# Patient Record
Sex: FEMALE | Race: WHITE | Employment: FULL TIME | URBAN - METROPOLITAN AREA
[De-identification: names, ages, dates, MRNs, and addresses within clinical notes are randomized per-mention and may not be internally consistent; named-entity substitution may affect disease eponyms.]

---
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
## 2021-02-04 ENCOUNTER — OFFICE VISIT (OUTPATIENT)
Dept: PRIMARY CARE CLINIC | Age: 21
End: 2021-02-04
Payer: COMMERCIAL

## 2021-02-04 VITALS
HEART RATE: 67 BPM | SYSTOLIC BLOOD PRESSURE: 118 MMHG | OXYGEN SATURATION: 99 % | DIASTOLIC BLOOD PRESSURE: 78 MMHG | WEIGHT: 159 LBS | HEIGHT: 66 IN | TEMPERATURE: 97.7 F | BODY MASS INDEX: 25.55 KG/M2

## 2021-02-04 DIAGNOSIS — H55.09 HORIZONTAL NYSTAGMUS: ICD-10-CM

## 2021-02-04 DIAGNOSIS — Z23 NEED FOR INFLUENZA VACCINATION: ICD-10-CM

## 2021-02-04 DIAGNOSIS — E55.9 VITAMIN D INSUFFICIENCY: ICD-10-CM

## 2021-02-04 DIAGNOSIS — F41.1 GENERALIZED ANXIETY DISORDER: ICD-10-CM

## 2021-02-04 DIAGNOSIS — F41.1 GENERALIZED ANXIETY DISORDER: Primary | ICD-10-CM

## 2021-02-04 DIAGNOSIS — G43.009 MIGRAINE WITHOUT AURA AND WITHOUT STATUS MIGRAINOSUS, NOT INTRACTABLE: ICD-10-CM

## 2021-02-04 LAB
ALBUMIN SERPL-MCNC: 4.5 G/DL (ref 3.5–5.2)
ALP BLD-CCNC: 49 U/L (ref 35–104)
ALT SERPL-CCNC: 13 U/L (ref 0–32)
ANION GAP SERPL CALCULATED.3IONS-SCNC: 16 MMOL/L (ref 7–16)
AST SERPL-CCNC: 16 U/L (ref 0–31)
BASOPHILS ABSOLUTE: 0.04 E9/L (ref 0–0.2)
BASOPHILS RELATIVE PERCENT: 0.6 % (ref 0–2)
BILIRUB SERPL-MCNC: 0.4 MG/DL (ref 0–1.2)
BUN BLDV-MCNC: 12 MG/DL (ref 6–20)
CALCIUM SERPL-MCNC: 9.7 MG/DL (ref 8.6–10.2)
CHLORIDE BLD-SCNC: 103 MMOL/L (ref 98–107)
CO2: 23 MMOL/L (ref 22–29)
CREAT SERPL-MCNC: 0.8 MG/DL (ref 0.5–1)
EOSINOPHILS ABSOLUTE: 0.07 E9/L (ref 0.05–0.5)
EOSINOPHILS RELATIVE PERCENT: 1 % (ref 0–6)
GFR AFRICAN AMERICAN: >60
GFR NON-AFRICAN AMERICAN: >60 ML/MIN/1.73
GLUCOSE BLD-MCNC: 95 MG/DL (ref 74–99)
HCT VFR BLD CALC: 41.2 % (ref 34–48)
HEMOGLOBIN: 13.5 G/DL (ref 11.5–15.5)
IMMATURE GRANULOCYTES #: 0.02 E9/L
IMMATURE GRANULOCYTES %: 0.3 % (ref 0–5)
LYMPHOCYTES ABSOLUTE: 2.37 E9/L (ref 1.5–4)
LYMPHOCYTES RELATIVE PERCENT: 35.4 % (ref 20–42)
MCH RBC QN AUTO: 28.2 PG (ref 26–35)
MCHC RBC AUTO-ENTMCNC: 32.8 % (ref 32–34.5)
MCV RBC AUTO: 86 FL (ref 80–99.9)
MONOCYTES ABSOLUTE: 0.57 E9/L (ref 0.1–0.95)
MONOCYTES RELATIVE PERCENT: 8.5 % (ref 2–12)
NEUTROPHILS ABSOLUTE: 3.63 E9/L (ref 1.8–7.3)
NEUTROPHILS RELATIVE PERCENT: 54.2 % (ref 43–80)
PDW BLD-RTO: 12.9 FL (ref 11.5–15)
PLATELET # BLD: 354 E9/L (ref 130–450)
PMV BLD AUTO: 9.2 FL (ref 7–12)
POTASSIUM SERPL-SCNC: 4.1 MMOL/L (ref 3.5–5)
RBC # BLD: 4.79 E12/L (ref 3.5–5.5)
SODIUM BLD-SCNC: 142 MMOL/L (ref 132–146)
T4 FREE: 1.28 NG/DL (ref 0.93–1.7)
TOTAL PROTEIN: 7.7 G/DL (ref 6.4–8.3)
TSH SERPL DL<=0.05 MIU/L-ACNC: 2.37 UIU/ML (ref 0.27–4.2)
VITAMIN D 25-HYDROXY: 20 NG/ML (ref 30–100)
WBC # BLD: 6.7 E9/L (ref 4.5–11.5)

## 2021-02-04 PROCEDURE — G8419 CALC BMI OUT NRM PARAM NOF/U: HCPCS | Performed by: FAMILY MEDICINE

## 2021-02-04 PROCEDURE — 99214 OFFICE O/P EST MOD 30 MIN: CPT | Performed by: FAMILY MEDICINE

## 2021-02-04 PROCEDURE — 90471 IMMUNIZATION ADMIN: CPT | Performed by: FAMILY MEDICINE

## 2021-02-04 PROCEDURE — 90686 IIV4 VACC NO PRSV 0.5 ML IM: CPT | Performed by: FAMILY MEDICINE

## 2021-02-04 PROCEDURE — 1036F TOBACCO NON-USER: CPT | Performed by: FAMILY MEDICINE

## 2021-02-04 PROCEDURE — G8427 DOCREV CUR MEDS BY ELIG CLIN: HCPCS | Performed by: FAMILY MEDICINE

## 2021-02-04 PROCEDURE — G8482 FLU IMMUNIZE ORDER/ADMIN: HCPCS | Performed by: FAMILY MEDICINE

## 2021-02-04 RX ORDER — CITALOPRAM 10 MG/1
10 TABLET ORAL DAILY
Qty: 30 TABLET | Refills: 0 | Status: SHIPPED
Start: 2021-02-04 | End: 2021-03-06 | Stop reason: SDUPTHER

## 2021-02-04 RX ORDER — NORETHINDRONE ACETATE AND ETHINYL ESTRADIOL 1; .02 MG/1; MG/1
1 TABLET ORAL DAILY
COMMUNITY
End: 2021-03-29

## 2021-02-04 ASSESSMENT — PATIENT HEALTH QUESTIONNAIRE - PHQ9
SUM OF ALL RESPONSES TO PHQ9 QUESTIONS 1 & 2: 0
2. FEELING DOWN, DEPRESSED OR HOPELESS: 0
SUM OF ALL RESPONSES TO PHQ QUESTIONS 1-9: 0
SUM OF ALL RESPONSES TO PHQ QUESTIONS 1-9: 0
1. LITTLE INTEREST OR PLEASURE IN DOING THINGS: 0

## 2021-02-04 ASSESSMENT — ENCOUNTER SYMPTOMS
VOMITING: 0
DIARRHEA: 0
ABDOMINAL PAIN: 0
NAUSEA: 0
COUGH: 0
PHOTOPHOBIA: 0
WHEEZING: 0
SHORTNESS OF BREATH: 0
BACK PAIN: 0
ABDOMINAL DISTENTION: 0
CONSTIPATION: 0
EYE DISCHARGE: 0

## 2021-02-04 NOTE — PROGRESS NOTES
21  Wilmar Bowels : 2000 Sex: female  Age: 24 y.o. Chief Complaint   Patient presents with    Anxiety     discuss medications     HPI:  24 y.o. female presents today for 1 year follow up of migraines and anxiety. Patient's chart, medical, surgical and medication history all reviewed. Migraine  Patient complains of migraine headaches. She does not have a headache at this time. Description of Headaches:  Location of pain: left-sided unilateral  Radiation of pain?:none  Character of pain:aching and throbbing  Accompanying symptoms: nausea, vomiting, sonophobia, photophobia  Prodromal sx?: none  Typical precipitants: unknown    Temporal Pattern of Headaches:  Started having HAs 2 year ago  Worst time of day: any time  Awaken from sleep?: no, but will wake up in the morning with a headache. Seasonal pattern?: no  Clustering of HAs over time? no  Change with menstrual cycle?: no  Overall pattern since problem began: gradually improving    Degree of Functional Impairment: moderate    Current Use of Meds to Treat HA:  Abortive meds? Imitrex (has not used since starting Metoprolol- didn't know she could)  Daily use? no  Prophylactic meds? Metoprolol    Previous Meds Tried for Treatment of HA:  Beta blockers:  Metoprolol  Calcium channel blockers:  none  Anti-convulsants:  none  Triptans/Others:  Ibuprofen and Sumatriptan  oral (Imitrex )  Botox injections: no      Additional Relevant History:  History of head/neck trauma? no  History of head/neck surgery? no  Family h/o headache problems? no  Use of meds that might worsen HAs? no  Exposure to carbon monoxide? no  Substance use: alcohol: no, illicit drugs: no, tobacco: no, caffeine: no    Anxiety  Patient complains of evaluation of anxiety disorder and sleep disturbance. She has the following anxiety symptoms: difficulty concentrating, fatigue, feelings of losing control, racing thoughts, hypersomnia.  Onset of symptoms was approximately several years ago, gradually worsening since that time. She denies current suicidal and homicidal ideation. Previous treatment includes Lexapro and no therapy. She complains of the following side effects from the treatment: none. She was on previous treatment by last PCP, but doesn't remember why she stopped taking. ROS:  Review of Systems   Constitutional: Negative for chills, fatigue and fever. Eyes: Negative for photophobia, discharge and visual disturbance. Respiratory: Negative for cough, shortness of breath and wheezing. Cardiovascular: Negative for chest pain and palpitations. Gastrointestinal: Negative for abdominal distention, abdominal pain, constipation, diarrhea, nausea and vomiting. Musculoskeletal: Negative for arthralgias and back pain. Skin: Negative for rash. Neurological: Positive for headaches. Negative for dizziness. Psychiatric/Behavioral: Positive for sleep disturbance. Negative for dysphoric mood. The patient is nervous/anxious. All other systems reviewed and are negative.        Current Outpatient Medications:     norethindrone-ethinyl estradiol (JUNEL 1/20) 1-20 MG-MCG per tablet, Take 1 tablet by mouth daily, Disp: , Rfl:     citalopram (CELEXA) 10 MG tablet, Take 1 tablet by mouth daily, Disp: 30 tablet, Rfl: 0    metoprolol succinate (TOPROL XL) 25 MG extended release tablet, TAKE ONE TABLET BY MOUTH DAILY, Disp: 90 tablet, Rfl: 1    dicyclomine (BENTYL) 10 MG capsule, Take 1 capsule by mouth 4 times daily, Disp: 120 capsule, Rfl: 5    Fluticasone furoate-vilanterol (BREO ELLIPTA) 200-25 MCG/INH AEPB inhaler, Inhale into the lungs daily, Disp: , Rfl:     albuterol sulfate  (90 Base) MCG/ACT inhaler, Inhale 1 puff into the lungs as needed, Disp: , Rfl:     minocycline (MINOCIN;DYNACIN) 100 MG capsule, Take 100 mg by mouth daily, Disp: , Rfl:     SUMAtriptan (IMITREX) 50 MG tablet, Take 50 mg by mouth once as needed for Migraine, Disp: , Rfl:     No Known Allergies    History reviewed. No pertinent past medical history. History reviewed. No pertinent surgical history. History reviewed. No pertinent family history. Social History     Socioeconomic History    Marital status: Single     Spouse name: Not on file    Number of children: Not on file    Years of education: Not on file    Highest education level: Not on file   Occupational History    Not on file   Social Needs    Financial resource strain: Not on file    Food insecurity     Worry: Not on file     Inability: Not on file    Transportation needs     Medical: Not on file     Non-medical: Not on file   Tobacco Use    Smoking status: Never Smoker    Smokeless tobacco: Never Used   Substance and Sexual Activity    Alcohol use: Not on file    Drug use: Not on file    Sexual activity: Not on file   Lifestyle    Physical activity     Days per week: Not on file     Minutes per session: Not on file    Stress: Not on file   Relationships    Social connections     Talks on phone: Not on file     Gets together: Not on file     Attends Zoroastrian service: Not on file     Active member of club or organization: Not on file     Attends meetings of clubs or organizations: Not on file     Relationship status: Not on file    Intimate partner violence     Fear of current or ex partner: Not on file     Emotionally abused: Not on file     Physically abused: Not on file     Forced sexual activity: Not on file   Other Topics Concern    Not on file   Social History Narrative    Not on file       Vitals:    02/04/21 0850   BP: 118/78   Pulse: 67   Temp: 97.7 °F (36.5 °C)   SpO2: 99%   Weight: 159 lb (72.1 kg)   Height: 5' 6\" (1.676 m)       Physical Exam:  Physical Exam  Vitals signs and nursing note reviewed. Constitutional:       General: She is not in acute distress. Appearance: Normal appearance. She is well-developed and normal weight. She is not ill-appearing.    HENT:      Head: also check labs. Migraine without aura and without status migrainosus, not intractable  Well controlled with Metoprolol. Explained that it is OK for her to take Imitrex if she still needs it when she gets a migraine. Horizontal nystagmus  Did not have MRI and did not see Neuro due to pandemic at the time of her issues. Will monitor. Vitamin D insufficiency  -     Vitamin D 25 Hydroxy; Future    Need for influenza vaccination  -     INFLUENZA, QUADV, 3 YRS AND OLDER, IM PF, PREFILL SYR OR SDV, 0.5ML (AFLURIA QUADV, PF)          Return in about 4 weeks (around 3/4/2021) for Recheck.       Seen By:  Charlette Almonte, DO

## 2021-02-23 RX ORDER — ALBUTEROL SULFATE 90 UG/1
1 AEROSOL, METERED RESPIRATORY (INHALATION) EVERY 4 HOURS PRN
Qty: 3 INHALER | Refills: 1 | Status: SHIPPED
Start: 2021-02-23 | End: 2021-06-29

## 2021-02-23 RX ORDER — FLUTICASONE FUROATE AND VILANTEROL 200; 25 UG/1; UG/1
1 POWDER RESPIRATORY (INHALATION) DAILY
Qty: 3 EACH | Refills: 1 | Status: SHIPPED
Start: 2021-02-23 | End: 2021-11-10 | Stop reason: SDUPTHER

## 2021-03-28 ENCOUNTER — IMMUNIZATION (OUTPATIENT)
Dept: PRIMARY CARE CLINIC | Age: 21
End: 2021-03-28
Payer: COMMERCIAL

## 2021-03-28 PROCEDURE — 0001A PR IMM ADMN SARSCOV2 30MCG/0.3ML DIL RECON 1ST DOSE: CPT | Performed by: PHYSICIAN ASSISTANT

## 2021-03-28 PROCEDURE — 91300 COVID-19, PFIZER VACCINE 30MCG/0.3ML DOSE: CPT | Performed by: PHYSICIAN ASSISTANT

## 2021-03-29 ENCOUNTER — OFFICE VISIT (OUTPATIENT)
Dept: PRIMARY CARE CLINIC | Age: 21
End: 2021-03-29
Payer: COMMERCIAL

## 2021-03-29 VITALS
WEIGHT: 161 LBS | OXYGEN SATURATION: 100 % | BODY MASS INDEX: 25.88 KG/M2 | TEMPERATURE: 97.9 F | DIASTOLIC BLOOD PRESSURE: 80 MMHG | SYSTOLIC BLOOD PRESSURE: 118 MMHG | HEIGHT: 66 IN | HEART RATE: 59 BPM

## 2021-03-29 DIAGNOSIS — F41.1 GENERALIZED ANXIETY DISORDER: Primary | ICD-10-CM

## 2021-03-29 DIAGNOSIS — G43.009 MIGRAINE WITHOUT AURA AND WITHOUT STATUS MIGRAINOSUS, NOT INTRACTABLE: ICD-10-CM

## 2021-03-29 PROCEDURE — G8427 DOCREV CUR MEDS BY ELIG CLIN: HCPCS | Performed by: FAMILY MEDICINE

## 2021-03-29 PROCEDURE — G8419 CALC BMI OUT NRM PARAM NOF/U: HCPCS | Performed by: FAMILY MEDICINE

## 2021-03-29 PROCEDURE — 1036F TOBACCO NON-USER: CPT | Performed by: FAMILY MEDICINE

## 2021-03-29 PROCEDURE — G8482 FLU IMMUNIZE ORDER/ADMIN: HCPCS | Performed by: FAMILY MEDICINE

## 2021-03-29 PROCEDURE — 99214 OFFICE O/P EST MOD 30 MIN: CPT | Performed by: FAMILY MEDICINE

## 2021-03-29 RX ORDER — CITALOPRAM 20 MG/1
20 TABLET ORAL DAILY
Qty: 90 TABLET | Refills: 0 | Status: SHIPPED
Start: 2021-03-29 | End: 2021-06-29

## 2021-03-29 ASSESSMENT — ENCOUNTER SYMPTOMS
VOMITING: 0
CONSTIPATION: 0
ABDOMINAL DISTENTION: 0
COUGH: 0
ABDOMINAL PAIN: 0
BACK PAIN: 0
SHORTNESS OF BREATH: 0
PHOTOPHOBIA: 0
EYE DISCHARGE: 0
DIARRHEA: 0
NAUSEA: 0
WHEEZING: 0

## 2021-03-29 NOTE — PROGRESS NOTES
3/29/21  Oval Rust : 2000 Sex: female  Age: 24 y.o. Chief Complaint   Patient presents with    Anxiety     HPI:  24 y.o. female presents today for 2 month follow up of migraines and anxiety. Patient's chart, medical, surgical and medication history all reviewed. Migraine  Patient complains of migraine headaches. She does not have a headache at this time. Description of Headaches:  Location of pain: left-sided unilateral  Radiation of pain?:none  Character of pain:aching and throbbing  Accompanying symptoms: nausea, vomiting, sonophobia, photophobia  Prodromal sx?: none  Typical precipitants: unknown    Temporal Pattern of Headaches:  Started having HAs 2 year ago  Worst time of day: any time  Awaken from sleep?: no, but will wake up in the morning with a headache. Seasonal pattern?: no  Clustering of HAs over time? no  Change with menstrual cycle?: no  Overall pattern since problem began: gradually improving    Degree of Functional Impairment: moderate    Current Use of Meds to Treat HA:  Abortive meds? Imitrex   Daily use? no  Prophylactic meds? Metoprolol    Previous Meds Tried for Treatment of HA:  Beta blockers:  Metoprolol  Calcium channel blockers:  none  Anti-convulsants:  none  Triptans/Others:  Ibuprofen and Sumatriptan  oral (Imitrex )  Botox injections: no      Additional Relevant History:  History of head/neck trauma? no  History of head/neck surgery? no  Family h/o headache problems? no  Use of meds that might worsen HAs? no  Exposure to carbon monoxide? no  Substance use: alcohol: no, illicit drugs: no, tobacco: no, caffeine: no    Anxiety  Patient complains of evaluation of anxiety disorder and sleep disturbance. She has the following anxiety symptoms: difficulty concentrating, fatigue, feelings of losing control, racing thoughts, hypersomnia. Onset of symptoms was approximately several years ago, gradually worsening since that time.  She denies current suicidal and homicidal ideation. Previous treatment includes Celexa and Lexapro and no therapy. She complains of the following side effects from the treatment: none. Patient was started on Celexa last OV. Has been feeling overall better with less panic attacks. ROS:  Review of Systems   Constitutional: Negative for chills, fatigue and fever. Eyes: Negative for photophobia, discharge and visual disturbance. Respiratory: Negative for cough, shortness of breath and wheezing. Cardiovascular: Negative for chest pain and palpitations. Gastrointestinal: Negative for abdominal distention, abdominal pain, constipation, diarrhea, nausea and vomiting. Musculoskeletal: Negative for arthralgias and back pain. Skin: Negative for rash. Neurological: Positive for headaches. Negative for dizziness. Psychiatric/Behavioral: Positive for sleep disturbance. Negative for dysphoric mood. The patient is nervous/anxious. All other systems reviewed and are negative.        Current Outpatient Medications:     citalopram (CELEXA) 20 MG tablet, Take 1 tablet by mouth daily, Disp: 90 tablet, Rfl: 0    metoprolol succinate (TOPROL XL) 25 MG extended release tablet, Take 1 tablet by mouth daily, Disp: 90 tablet, Rfl: 1    norethindrone-ethinyl estradiol (JUNEL FE 1/20) 1-20 MG-MCG per tablet, Take 1 tablet by mouth daily, Disp: 84 tablet, Rfl: 3    albuterol sulfate  (90 Base) MCG/ACT inhaler, Inhale 1 puff into the lungs every 4 hours as needed for Wheezing or Shortness of Breath, Disp: 3 Inhaler, Rfl: 1    Fluticasone furoate-vilanterol (BREO ELLIPTA) 200-25 MCG/INH AEPB inhaler, Inhale 1 puff into the lungs daily, Disp: 3 each, Rfl: 1    SUMAtriptan (IMITREX) 50 MG tablet, Take 50 mg by mouth once as needed for Migraine, Disp: , Rfl:     dicyclomine (BENTYL) 10 MG capsule, Take 1 capsule by mouth 4 times daily, Disp: 120 capsule, Rfl: 5    minocycline (MINOCIN;DYNACIN) 100 MG capsule, Take 100 mg by mouth daily, Disp: , Rfl:     No Known Allergies    History reviewed. No pertinent past medical history. History reviewed. No pertinent surgical history. History reviewed. No pertinent family history. Social History     Socioeconomic History    Marital status: Single     Spouse name: Not on file    Number of children: Not on file    Years of education: Not on file    Highest education level: Not on file   Occupational History    Not on file   Social Needs    Financial resource strain: Not on file    Food insecurity     Worry: Not on file     Inability: Not on file    Transportation needs     Medical: Not on file     Non-medical: Not on file   Tobacco Use    Smoking status: Never Smoker    Smokeless tobacco: Never Used   Substance and Sexual Activity    Alcohol use: Not on file    Drug use: Not on file    Sexual activity: Not on file   Lifestyle    Physical activity     Days per week: Not on file     Minutes per session: Not on file    Stress: Not on file   Relationships    Social connections     Talks on phone: Not on file     Gets together: Not on file     Attends Taoism service: Not on file     Active member of club or organization: Not on file     Attends meetings of clubs or organizations: Not on file     Relationship status: Not on file    Intimate partner violence     Fear of current or ex partner: Not on file     Emotionally abused: Not on file     Physically abused: Not on file     Forced sexual activity: Not on file   Other Topics Concern    Not on file   Social History Narrative    Not on file       Vitals:    03/29/21 0921   BP: 118/80   Pulse: 59   Temp: 97.9 °F (36.6 °C)   SpO2: 100%   Weight: 161 lb (73 kg)   Height: 5' 6\" (1.676 m)       Physical Exam:  Physical Exam  Vitals signs and nursing note reviewed. Constitutional:       General: She is not in acute distress. Appearance: Normal appearance. She is well-developed and normal weight. She is not ill-appearing.    HENT: Head: Normocephalic and atraumatic. Right Ear: External ear normal.      Left Ear: External ear normal.      Nose:      Comments: Wearing mask  Eyes:      General: No scleral icterus. Extraocular Movements: Extraocular movements intact. Conjunctiva/sclera: Conjunctivae normal.   Neck:      Musculoskeletal: Normal range of motion and neck supple. Thyroid: No thyromegaly. Cardiovascular:      Rate and Rhythm: Normal rate and regular rhythm. Heart sounds: Normal heart sounds. No murmur. Pulmonary:      Effort: Pulmonary effort is normal. No respiratory distress. Breath sounds: Normal breath sounds. No wheezing. Musculoskeletal: Normal range of motion. General: No tenderness or deformity. Right lower leg: No edema. Left lower leg: No edema. Lymphadenopathy:      Cervical: No cervical adenopathy. Skin:     General: Skin is warm and dry. Findings: No rash. Neurological:      General: No focal deficit present. Mental Status: She is alert and oriented to person, place, and time. Gait: Gait normal.   Psychiatric:         Mood and Affect: Mood is anxious. Speech: Speech normal.         Behavior: Behavior normal.         Thought Content:  Thought content normal.       Labs  CBC with Differential:    Lab Results   Component Value Date    WBC 6.7 02/04/2021    RBC 4.79 02/04/2021    HGB 13.5 02/04/2021    HCT 41.2 02/04/2021     02/04/2021    MCV 86.0 02/04/2021    MCH 28.2 02/04/2021    MCHC 32.8 02/04/2021    RDW 12.9 02/04/2021    LYMPHOPCT 35.4 02/04/2021    MONOPCT 8.5 02/04/2021    BASOPCT 0.6 02/04/2021    MONOSABS 0.57 02/04/2021    LYMPHSABS 2.37 02/04/2021    EOSABS 0.07 02/04/2021    BASOSABS 0.04 02/04/2021     CMP:    Lab Results   Component Value Date     02/04/2021    K 4.1 02/04/2021     02/04/2021    CO2 23 02/04/2021    BUN 12 02/04/2021    CREATININE 0.8 02/04/2021    GFRAA >60 02/04/2021    LABGLOM >60 02/04/2021    GLUCOSE 95 02/04/2021    PROT 7.7 02/04/2021    LABALBU 4.5 02/04/2021    CALCIUM 9.7 02/04/2021    BILITOT 0.4 02/04/2021    ALKPHOS 49 02/04/2021    AST 16 02/04/2021    ALT 13 02/04/2021     U/A:  No results found for: NITRITE, COLORU, PROTEINU, PHUR, LABCAST, WBCUA, RBCUA, MUCUS, TRICHOMONAS, YEAST, BACTERIA, CLARITYU, SPECGRAV, LEUKOCYTESUR, UROBILINOGEN, BILIRUBINUR, BLOODU, GLUCOSEU, AMORPHOUS  HgBA1c:    Lab Results   Component Value Date    LABA1C 4.9 10/28/2019     FLP:  No results found for: TRIG, HDL, LDLCALC, LDLDIRECT, LABVLDL  TSH:    Lab Results   Component Value Date    TSH 2.370 02/04/2021        Assessment and Plan:  Praveena Oshea was seen today for anxiety. Diagnoses and all orders for this visit:    Generalized anxiety disorder  -     citalopram (CELEXA) 20 MG tablet; Take 1 tablet by mouth daily  Patient doing much better overall. Does think she would get more benefit from higher dose. No side effects. Will increase to 20 mg at this time. Migraine without aura and without status migrainosus, not intractable  Well controlled on Metoprolol. Imitrex helps within an hour. Return in about 3 months (around 6/29/2021) for Recheck.       Seen By:  Konrad Childress DO

## 2021-04-19 ENCOUNTER — IMMUNIZATION (OUTPATIENT)
Dept: PRIMARY CARE CLINIC | Age: 21
End: 2021-04-19
Payer: COMMERCIAL

## 2021-04-19 PROCEDURE — 91300 COVID-19, PFIZER VACCINE 30MCG/0.3ML DOSE: CPT | Performed by: PHYSICIAN ASSISTANT

## 2021-04-19 PROCEDURE — 0002A COVID-19, PFIZER VACCINE 30MCG/0.3ML DOSE: CPT | Performed by: PHYSICIAN ASSISTANT

## 2021-06-08 DIAGNOSIS — G43.009 MIGRAINE WITHOUT AURA AND WITHOUT STATUS MIGRAINOSUS, NOT INTRACTABLE: ICD-10-CM

## 2021-06-08 RX ORDER — SUMATRIPTAN 50 MG/1
50 TABLET, FILM COATED ORAL
Qty: 9 TABLET | Refills: 5 | Status: SHIPPED
Start: 2021-06-08 | End: 2021-06-29

## 2021-06-29 ENCOUNTER — OFFICE VISIT (OUTPATIENT)
Dept: PRIMARY CARE CLINIC | Age: 21
End: 2021-06-29
Payer: COMMERCIAL

## 2021-06-29 VITALS
HEIGHT: 66 IN | HEART RATE: 77 BPM | OXYGEN SATURATION: 99 % | SYSTOLIC BLOOD PRESSURE: 118 MMHG | TEMPERATURE: 97.3 F | BODY MASS INDEX: 26.52 KG/M2 | DIASTOLIC BLOOD PRESSURE: 80 MMHG | WEIGHT: 165 LBS

## 2021-06-29 DIAGNOSIS — R55 NEAR SYNCOPE: ICD-10-CM

## 2021-06-29 DIAGNOSIS — F41.1 GENERALIZED ANXIETY DISORDER: ICD-10-CM

## 2021-06-29 DIAGNOSIS — H55.09 HORIZONTAL NYSTAGMUS: ICD-10-CM

## 2021-06-29 DIAGNOSIS — G43.009 MIGRAINE WITHOUT AURA AND WITHOUT STATUS MIGRAINOSUS, NOT INTRACTABLE: Primary | ICD-10-CM

## 2021-06-29 LAB
CHP ED QC CHECK: NORMAL
GLUCOSE BLD-MCNC: 116 MG/DL

## 2021-06-29 PROCEDURE — 99214 OFFICE O/P EST MOD 30 MIN: CPT | Performed by: FAMILY MEDICINE

## 2021-06-29 PROCEDURE — G8419 CALC BMI OUT NRM PARAM NOF/U: HCPCS | Performed by: FAMILY MEDICINE

## 2021-06-29 PROCEDURE — 82962 GLUCOSE BLOOD TEST: CPT | Performed by: FAMILY MEDICINE

## 2021-06-29 PROCEDURE — G8427 DOCREV CUR MEDS BY ELIG CLIN: HCPCS | Performed by: FAMILY MEDICINE

## 2021-06-29 PROCEDURE — 1036F TOBACCO NON-USER: CPT | Performed by: FAMILY MEDICINE

## 2021-06-29 RX ORDER — CITALOPRAM 20 MG/1
20 TABLET ORAL DAILY
COMMUNITY
End: 2021-12-15 | Stop reason: SDUPTHER

## 2021-06-29 RX ORDER — TOPIRAMATE 25 MG/1
50 TABLET ORAL 2 TIMES DAILY
Qty: 120 TABLET | Refills: 5 | Status: SHIPPED
Start: 2021-06-29 | End: 2021-11-10

## 2021-06-29 RX ORDER — SUMATRIPTAN 50 MG/1
50 TABLET, FILM COATED ORAL
COMMUNITY
End: 2022-08-09 | Stop reason: ALTCHOICE

## 2021-06-29 RX ORDER — CITALOPRAM 20 MG/1
20 TABLET ORAL DAILY
Qty: 90 TABLET | Refills: 1 | Status: SHIPPED
Start: 2021-06-29 | End: 2021-11-10

## 2021-06-29 ASSESSMENT — ENCOUNTER SYMPTOMS
VOMITING: 0
PHOTOPHOBIA: 0
EYE DISCHARGE: 0
ABDOMINAL DISTENTION: 0
SHORTNESS OF BREATH: 0
DIARRHEA: 0
BACK PAIN: 0
WHEEZING: 0
NAUSEA: 0
COUGH: 0
CONSTIPATION: 0
ABDOMINAL PAIN: 0

## 2021-06-29 NOTE — PROGRESS NOTES
several years ago, gradually worsening since that time. She denies current suicidal and homicidal ideation. Previous treatment includes Celexa and Lexapro and no therapy. She complains of the following side effects from the treatment: none. Patient noting further improvement with increased dose of Celexa. ROS:  Review of Systems   Constitutional: Negative for chills, fatigue and fever. Eyes: Negative for photophobia, discharge and visual disturbance. Respiratory: Negative for cough, shortness of breath and wheezing. Cardiovascular: Negative for chest pain and palpitations. Gastrointestinal: Negative for abdominal distention, abdominal pain, constipation, diarrhea, nausea and vomiting. Musculoskeletal: Negative for arthralgias and back pain. Skin: Negative for rash. Neurological: Positive for headaches. Negative for dizziness. Psychiatric/Behavioral: Positive for sleep disturbance. Negative for dysphoric mood. The patient is nervous/anxious. All other systems reviewed and are negative. Current Outpatient Medications:     citalopram (CELEXA) 20 MG tablet, Take 20 mg by mouth daily, Disp: , Rfl:     SUMAtriptan (IMITREX) 50 MG tablet, Take 50 mg by mouth once as needed for Migraine, Disp: , Rfl:     Cholecalciferol (VITAMIN D3) 125 MCG (5000 UT) TABS, Take 5,000 Units by mouth daily, Disp: , Rfl:     citalopram (CELEXA) 20 MG tablet, Take 1 tablet by mouth daily, Disp: 90 tablet, Rfl: 1    topiramate (TOPAMAX) 25 MG tablet, Take 2 tablets by mouth 2 times daily, Disp: 120 tablet, Rfl: 5    norethindrone-ethinyl estradiol (JUNEL FE 1/20) 1-20 MG-MCG per tablet, Take 1 tablet by mouth daily, Disp: 84 tablet, Rfl: 3    dicyclomine (BENTYL) 10 MG capsule, Take 1 capsule by mouth 4 times daily, Disp: 120 capsule, Rfl: 5    minocycline (MINOCIN;DYNACIN) 100 MG capsule, Take 100 mg by mouth daily, Disp: , Rfl:     No Known Allergies    History reviewed. No pertinent past medical history. History reviewed. No pertinent surgical history. History reviewed. No pertinent family history. Social History     Socioeconomic History    Marital status: Single     Spouse name: Not on file    Number of children: Not on file    Years of education: Not on file    Highest education level: Not on file   Occupational History    Not on file   Tobacco Use    Smoking status: Never Smoker    Smokeless tobacco: Never Used   Vaping Use    Vaping Use: Never used   Substance and Sexual Activity    Alcohol use: Not on file    Drug use: Not on file    Sexual activity: Not on file   Other Topics Concern    Not on file   Social History Narrative    Not on file     Social Determinants of Health     Financial Resource Strain:     Difficulty of Paying Living Expenses:    Food Insecurity:     Worried About Running Out of Food in the Last Year:     [address] in the Last Year:    Transportation Needs:     Lack of Transportation (Medical):  Lack of Transportation (Non-Medical):    Physical Activity:     Days of Exercise per Week:     Minutes of Exercise per Session:    Stress:     Feeling of Stress :    Social Connections:     Frequency of Communication with Friends and Family:     Frequency of Social Gatherings with Friends and Family:     Attends Anabaptist Services:     Active Member of Clubs or Organizations:     Attends Club or Organization Meetings:     Marital Status:    Intimate Partner Violence:     Fear of Current or Ex-Partner:     Emotionally Abused:     Physically Abused:     Sexually Abused:        Vitals:    06/29/21 0958 06/29/21 1036   BP: 116/82 118/80   Pulse: 71 77   Temp: 98.1 °F (36.7 °C) 97.3 °F (36.3 °C)   SpO2: 98% 99%   Weight: 165 lb (74.8 kg)    Height: 5' 6\" (1.676 m)        Physical Exam:  Physical Exam  Vitals and nursing note reviewed. Constitutional:       General: She is not in acute distress. Appearance: Normal appearance.  She is well-developed and normal weight. She is not ill-appearing. HENT:      Head: Normocephalic and atraumatic. Right Ear: External ear normal.      Left Ear: External ear normal.      Nose:      Comments: Wearing mask  Eyes:      General: No scleral icterus. Extraocular Movements: Extraocular movements intact. Conjunctiva/sclera: Conjunctivae normal.   Neck:      Thyroid: No thyromegaly. Cardiovascular:      Rate and Rhythm: Normal rate and regular rhythm. Heart sounds: Normal heart sounds. No murmur heard. Pulmonary:      Effort: Pulmonary effort is normal. No respiratory distress. Breath sounds: Normal breath sounds. No wheezing. Musculoskeletal:         General: No tenderness or deformity. Normal range of motion. Cervical back: Normal range of motion and neck supple. Right lower leg: No edema. Left lower leg: No edema. Lymphadenopathy:      Cervical: No cervical adenopathy. Skin:     General: Skin is warm and dry. Findings: No rash. Neurological:      General: No focal deficit present. Mental Status: She is alert and oriented to person, place, and time. Gait: Gait normal.   Psychiatric:         Mood and Affect: Mood is anxious. Speech: Speech normal.         Behavior: Behavior normal.         Thought Content:  Thought content normal.       Labs  CBC with Differential:    Lab Results   Component Value Date    WBC 6.7 02/04/2021    RBC 4.79 02/04/2021    HGB 13.5 02/04/2021    HCT 41.2 02/04/2021     02/04/2021    MCV 86.0 02/04/2021    MCH 28.2 02/04/2021    MCHC 32.8 02/04/2021    RDW 12.9 02/04/2021    LYMPHOPCT 35.4 02/04/2021    MONOPCT 8.5 02/04/2021    BASOPCT 0.6 02/04/2021    MONOSABS 0.57 02/04/2021    LYMPHSABS 2.37 02/04/2021    EOSABS 0.07 02/04/2021    BASOSABS 0.04 02/04/2021     CMP:    Lab Results   Component Value Date     02/04/2021    K 4.1 02/04/2021     02/04/2021    CO2 23 02/04/2021    BUN 12 02/04/2021 CREATININE 0.8 02/04/2021    GFRAA >60 02/04/2021    LABGLOM >60 02/04/2021    GLUCOSE 95 02/04/2021    PROT 7.7 02/04/2021    LABALBU 4.5 02/04/2021    CALCIUM 9.7 02/04/2021    BILITOT 0.4 02/04/2021    ALKPHOS 49 02/04/2021    AST 16 02/04/2021    ALT 13 02/04/2021     U/A:  No results found for: NITRITE, COLORU, PROTEINU, PHUR, LABCAST, WBCUA, RBCUA, MUCUS, TRICHOMONAS, YEAST, BACTERIA, CLARITYU, SPECGRAV, LEUKOCYTESUR, UROBILINOGEN, BILIRUBINUR, BLOODU, GLUCOSEU, AMORPHOUS  HgBA1c:    Lab Results   Component Value Date    LABA1C 4.9 10/28/2019     FLP:  No results found for: TRIG, HDL, LDLCALC, LDLDIRECT, LABVLDL  TSH:    Lab Results   Component Value Date    TSH 2.370 02/04/2021        Assessment and Plan:  Rica Burkett was seen today for anxiety and migraine. Diagnoses and all orders for this visit:    Migraine without aura and without status migrainosus, not intractable  -     topiramate (TOPAMAX) 25 MG tablet; Take 2 tablets by mouth 2 times daily  Patient having fatigue throughout the day despite a full night of sleep. Will try switching to Topamax instead. Has had eyes/vision checked, but feels pain behind eyes with migraines. Generalized anxiety disorder  -     citalopram (CELEXA) 20 MG tablet; Take 1 tablet by mouth daily  Stable    Horizontal nystagmus  -     Ambulatory referral to Neurology  Mom still noticing issues with eyes \"bouncing\" back and forth. Never had CT that was ordered and never saw Neuro. Will get another opinion. At the end of the patient's appointment, she was getting up to speak to the referral specialists when she all of a sudden noted ringing in her ears and dizziness. Felt like she was going to pass out. She was seated in a chair and vitals were taken. All normal.  BS was 116. Patient improved within 10 minutes. Stated she hadn't had something to eat or drink today. Will monitor. Return in about 4 months (around 10/29/2021) for Recheck.       Seen By:  William Garcia June, DO

## 2021-11-10 ENCOUNTER — OFFICE VISIT (OUTPATIENT)
Dept: PRIMARY CARE CLINIC | Age: 21
End: 2021-11-10
Payer: COMMERCIAL

## 2021-11-10 VITALS
HEIGHT: 66 IN | BODY MASS INDEX: 27.32 KG/M2 | WEIGHT: 170 LBS | TEMPERATURE: 95.1 F | SYSTOLIC BLOOD PRESSURE: 122 MMHG | DIASTOLIC BLOOD PRESSURE: 80 MMHG

## 2021-11-10 DIAGNOSIS — F41.1 GENERALIZED ANXIETY DISORDER: ICD-10-CM

## 2021-11-10 DIAGNOSIS — F33.8 SEASONAL AFFECTIVE DISORDER (HCC): ICD-10-CM

## 2021-11-10 DIAGNOSIS — G43.009 MIGRAINE WITHOUT AURA AND WITHOUT STATUS MIGRAINOSUS, NOT INTRACTABLE: Primary | ICD-10-CM

## 2021-11-10 DIAGNOSIS — J45.20 MILD INTERMITTENT ASTHMA WITHOUT COMPLICATION: ICD-10-CM

## 2021-11-10 DIAGNOSIS — L70.0 CYSTIC ACNE: ICD-10-CM

## 2021-11-10 LAB
ALBUMIN SERPL-MCNC: 4.5 G/DL (ref 3.5–5.2)
ALP BLD-CCNC: 56 U/L (ref 35–104)
ALT SERPL-CCNC: 14 U/L (ref 0–32)
AST SERPL-CCNC: 16 U/L (ref 0–31)
BASOPHILS ABSOLUTE: 0.04 E9/L (ref 0–0.2)
BASOPHILS RELATIVE PERCENT: 0.7 % (ref 0–2)
BILIRUB SERPL-MCNC: 0.2 MG/DL (ref 0–1.2)
BILIRUBIN DIRECT: <0.2 MG/DL (ref 0–0.3)
BILIRUBIN, INDIRECT: NORMAL MG/DL (ref 0–1)
CHOLESTEROL, TOTAL: 183 MG/DL (ref 0–199)
CORTISOL TOTAL: 19.77 MCG/DL (ref 2.68–18.4)
EOSINOPHILS ABSOLUTE: 0.05 E9/L (ref 0.05–0.5)
EOSINOPHILS RELATIVE PERCENT: 0.9 % (ref 0–6)
FOLLICLE STIMULATING HORMONE: 5.4 MIU/ML
HCT VFR BLD CALC: 41.2 % (ref 34–48)
HDLC SERPL-MCNC: 49 MG/DL
HEMOGLOBIN: 13.7 G/DL (ref 11.5–15.5)
IMMATURE GRANULOCYTES #: 0.01 E9/L
IMMATURE GRANULOCYTES %: 0.2 % (ref 0–5)
LDL CHOLESTEROL CALCULATED: 115 MG/DL (ref 0–99)
LUTEINIZING HORMONE: 5.6 MIU/ML
LYMPHOCYTES ABSOLUTE: 1.81 E9/L (ref 1.5–4)
LYMPHOCYTES RELATIVE PERCENT: 32.6 % (ref 20–42)
MCH RBC QN AUTO: 28.8 PG (ref 26–35)
MCHC RBC AUTO-ENTMCNC: 33.3 % (ref 32–34.5)
MCV RBC AUTO: 86.7 FL (ref 80–99.9)
MONOCYTES ABSOLUTE: 0.43 E9/L (ref 0.1–0.95)
MONOCYTES RELATIVE PERCENT: 7.7 % (ref 2–12)
NEUTROPHILS ABSOLUTE: 3.21 E9/L (ref 1.8–7.3)
NEUTROPHILS RELATIVE PERCENT: 57.9 % (ref 43–80)
PDW BLD-RTO: 12.7 FL (ref 11.5–15)
PLATELET # BLD: 344 E9/L (ref 130–450)
PMV BLD AUTO: 9.1 FL (ref 7–12)
RBC # BLD: 4.75 E12/L (ref 3.5–5.5)
T4 FREE: 0.94 NG/DL (ref 0.93–1.7)
TOTAL PROTEIN: 8 G/DL (ref 6.4–8.3)
TRIGL SERPL-MCNC: 93 MG/DL (ref 0–149)
TSH SERPL DL<=0.05 MIU/L-ACNC: 1.08 UIU/ML (ref 0.27–4.2)
VLDLC SERPL CALC-MCNC: 19 MG/DL
WBC # BLD: 5.6 E9/L (ref 4.5–11.5)

## 2021-11-10 PROCEDURE — G8427 DOCREV CUR MEDS BY ELIG CLIN: HCPCS | Performed by: FAMILY MEDICINE

## 2021-11-10 PROCEDURE — G8484 FLU IMMUNIZE NO ADMIN: HCPCS | Performed by: FAMILY MEDICINE

## 2021-11-10 PROCEDURE — 99214 OFFICE O/P EST MOD 30 MIN: CPT | Performed by: FAMILY MEDICINE

## 2021-11-10 PROCEDURE — 1036F TOBACCO NON-USER: CPT | Performed by: FAMILY MEDICINE

## 2021-11-10 PROCEDURE — G8419 CALC BMI OUT NRM PARAM NOF/U: HCPCS | Performed by: FAMILY MEDICINE

## 2021-11-10 RX ORDER — BUPROPION HYDROCHLORIDE 150 MG/1
150 TABLET ORAL EVERY MORNING
Qty: 30 TABLET | Refills: 5 | Status: SHIPPED
Start: 2021-11-10 | End: 2021-12-15 | Stop reason: ALTCHOICE

## 2021-11-10 RX ORDER — FLUTICASONE FUROATE AND VILANTEROL 200; 25 UG/1; UG/1
1 POWDER RESPIRATORY (INHALATION) DAILY
Qty: 3 EACH | Refills: 1 | Status: SHIPPED | OUTPATIENT
Start: 2021-11-10 | End: 2022-02-08

## 2021-11-10 RX ORDER — TOPIRAMATE 50 MG/1
50 TABLET, FILM COATED ORAL 2 TIMES DAILY
Qty: 60 TABLET | Refills: 5 | Status: SHIPPED
Start: 2021-11-10 | End: 2022-08-09 | Stop reason: SDUPTHER

## 2021-11-10 RX ORDER — ALBUTEROL SULFATE 90 UG/1
2 AEROSOL, METERED RESPIRATORY (INHALATION) EVERY 6 HOURS PRN
COMMUNITY

## 2021-11-10 RX ORDER — ALBUTEROL SULFATE 90 UG/1
1 AEROSOL, METERED RESPIRATORY (INHALATION) EVERY 4 HOURS PRN
Qty: 3 EACH | Refills: 3 | Status: SHIPPED
Start: 2021-11-10 | End: 2021-12-15

## 2021-11-10 RX ORDER — TOPIRAMATE 25 MG/1
25 TABLET ORAL 2 TIMES DAILY
COMMUNITY
End: 2021-11-10

## 2021-11-10 ASSESSMENT — ENCOUNTER SYMPTOMS
CONSTIPATION: 0
COUGH: 0
NAUSEA: 0
BACK PAIN: 0
DIARRHEA: 0
PHOTOPHOBIA: 0
ABDOMINAL PAIN: 0
ABDOMINAL DISTENTION: 0
VOMITING: 0
SHORTNESS OF BREATH: 0
EYE DISCHARGE: 0
WHEEZING: 0

## 2021-11-10 NOTE — PROGRESS NOTES
11/10/21  Gerardo Ek : 2000 Sex: female  Age: 22 y.o. Chief Complaint   Patient presents with    Migraine     topamax is helping with migraines     HPI:  22 y.o. female presents today for 6 month follow up of chronic medical conditions, medication refills. Patient's chart, medical, surgical and medication history all reviewed. Migraine  Patient complains of migraine headaches. She does not have a headache at this time. Description of Headaches:  Location of pain: left-sided unilateral  Radiation of pain?:none  Character of pain:aching and throbbing  Accompanying symptoms: nausea, vomiting, sonophobia, photophobia  Prodromal sx?: pain behind eyes  Typical precipitants: unknown    Temporal Pattern of Headaches:  Started having HAs 2 year ago  Worst time of day: any time  Awaken from sleep?: no, but will wake up in the morning with a headache. Seasonal pattern?: no  Clustering of HAs over time? no  Change with menstrual cycle?: no  Overall pattern since problem began: gradually improving    Degree of Functional Impairment: moderate    Current Use of Meds to Treat HA:  Abortive meds? Imitrex   Daily use? no  Prophylactic meds? Topamax    Previous Meds Tried for Treatment of HA:  Beta blockers:  Metoprolol (caused fatigue)  Calcium channel blockers:  none  Anti-convulsants:  topamax  Triptans/Others:  Ibuprofen and Sumatriptan  oral (Imitrex )  Botox injections: no      Additional Relevant History:  History of head/neck trauma? no  History of head/neck surgery? no  Family h/o headache problems? no  Use of meds that might worsen HAs? no  Exposure to carbon monoxide? no  Substance use: alcohol: no, illicit drugs: no, tobacco: no, caffeine: no    Anxiety/Depression  Patient complains of evaluation of anxiety disorder and sleep disturbance. She has the following anxiety symptoms: difficulty concentrating, fatigue, feelings of losing control, racing thoughts, hypersomnia.  Onset of symptoms was approximately several years ago, gradually worsening since that time. She denies current suicidal and homicidal ideation. Previous treatment includes Celexa and Lexapro and no therapy. She complains of the following side effects from the treatment: none. Patient noting improvement in anxiety, but having issues with depression. Feels like the weather causes her to feel more down. ROS:  Review of Systems   Constitutional: Negative for chills, fatigue and fever. Eyes: Negative for photophobia, discharge and visual disturbance. Respiratory: Negative for cough, shortness of breath and wheezing. Cardiovascular: Negative for chest pain and palpitations. Gastrointestinal: Negative for abdominal distention, abdominal pain, constipation, diarrhea, nausea and vomiting. Musculoskeletal: Negative for arthralgias and back pain. Skin: Negative for rash. Neurological: Positive for headaches. Negative for dizziness. Psychiatric/Behavioral: Positive for sleep disturbance. Negative for dysphoric mood. The patient is nervous/anxious. All other systems reviewed and are negative.        Current Outpatient Medications:     albuterol sulfate HFA (VENTOLIN HFA) 108 (90 Base) MCG/ACT inhaler, Inhale 2 puffs into the lungs every 6 hours as needed for Wheezing, Disp: , Rfl:     albuterol sulfate  (90 Base) MCG/ACT inhaler, Inhale 1 puff into the lungs every 4 hours as needed for Wheezing or Shortness of Breath, Disp: 3 each, Rfl: 3    Fluticasone furoate-vilanterol (BREO ELLIPTA) 200-25 MCG/INH AEPB inhaler, Inhale 1 puff into the lungs daily, Disp: 3 each, Rfl: 1    buPROPion (WELLBUTRIN XL) 150 MG extended release tablet, Take 1 tablet by mouth every morning, Disp: 30 tablet, Rfl: 5    topiramate (TOPAMAX) 50 MG tablet, Take 1 tablet by mouth 2 times daily, Disp: 60 tablet, Rfl: 5    citalopram (CELEXA) 20 MG tablet, Take 20 mg by mouth daily, Disp: , Rfl:     SUMAtriptan (IMITREX) 50 MG tablet, Take 50 mg by mouth once as needed for Migraine, Disp: , Rfl:     Cholecalciferol (VITAMIN D3) 125 MCG (5000 UT) TABS, Take 5,000 Units by mouth daily, Disp: , Rfl:     norethindrone-ethinyl estradiol (JUNEL FE 1/20) 1-20 MG-MCG per tablet, Take 1 tablet by mouth daily, Disp: 84 tablet, Rfl: 3    minocycline (MINOCIN;DYNACIN) 100 MG capsule, Take 100 mg by mouth daily, Disp: , Rfl:     No Known Allergies    History reviewed. No pertinent past medical history. History reviewed. No pertinent surgical history. History reviewed. No pertinent family history. Social History     Socioeconomic History    Marital status: Single     Spouse name: Not on file    Number of children: Not on file    Years of education: Not on file    Highest education level: Not on file   Occupational History    Not on file   Tobacco Use    Smoking status: Never Smoker    Smokeless tobacco: Never Used   Vaping Use    Vaping Use: Never used   Substance and Sexual Activity    Alcohol use: Not on file    Drug use: Not on file    Sexual activity: Not on file   Other Topics Concern    Not on file   Social History Narrative    Not on file     Social Determinants of Health     Financial Resource Strain:     Difficulty of Paying Living Expenses: Not on file   Food Insecurity:     Worried About Running Out of Food in the Last Year: Not on file    Leonarda of Food in the Last Year: Not on file   Transportation Needs:     Lack of Transportation (Medical): Not on file    Lack of Transportation (Non-Medical):  Not on file   Physical Activity:     Days of Exercise per Week: Not on file    Minutes of Exercise per Session: Not on file   Stress:     Feeling of Stress : Not on file   Social Connections:     Frequency of Communication with Friends and Family: Not on file    Frequency of Social Gatherings with Friends and Family: Not on file    Attends Jew Services: Not on file    Active Member of Clubs or Organizations: Not on file    Attends Club or Organization Meetings: Not on file    Marital Status: Not on file   Intimate Partner Violence:     Fear of Current or Ex-Partner: Not on file    Emotionally Abused: Not on file    Physically Abused: Not on file    Sexually Abused: Not on file   Housing Stability:     Unable to Pay for Housing in the Last Year: Not on file    Number of [place] in the Last Year: Not on file    Unstable Housing in the Last Year: Not on file       Vitals:    11/10/21 0803   BP: 122/80   Temp: 95.1 °F (35.1 °C)   Weight: 170 lb (77.1 kg)   Height: 5' 6\" (1.676 m)       Physical Exam:  Physical Exam  Vitals and nursing note reviewed. Constitutional:       General: She is not in acute distress. Appearance: Normal appearance. She is well-developed and normal weight. She is not ill-appearing. HENT:      Head: Normocephalic and atraumatic. Right Ear: External ear normal.      Left Ear: External ear normal.      Nose:      Comments: Wearing mask  Eyes:      General: No scleral icterus. Extraocular Movements: Extraocular movements intact. Conjunctiva/sclera: Conjunctivae normal.   Neck:      Thyroid: No thyromegaly. Cardiovascular:      Rate and Rhythm: Normal rate and regular rhythm. Heart sounds: Normal heart sounds. No murmur heard. Pulmonary:      Effort: Pulmonary effort is normal. No respiratory distress. Breath sounds: Normal breath sounds. No wheezing. Musculoskeletal:         General: No tenderness or deformity. Normal range of motion. Cervical back: Normal range of motion and neck supple. Right lower leg: No edema. Left lower leg: No edema. Lymphadenopathy:      Cervical: No cervical adenopathy. Skin:     General: Skin is warm and dry. Findings: No rash. Neurological:      General: No focal deficit present. Mental Status: She is alert and oriented to person, place, and time.       Gait: Gait normal.   Psychiatric: Mood and Affect: Mood normal.         Speech: Speech normal.         Behavior: Behavior normal.         Thought Content: Thought content normal.       Labs  CBC with Differential:    Lab Results   Component Value Date    WBC 6.7 02/04/2021    RBC 4.79 02/04/2021    HGB 13.5 02/04/2021    HCT 41.2 02/04/2021     02/04/2021    MCV 86.0 02/04/2021    MCH 28.2 02/04/2021    MCHC 32.8 02/04/2021    RDW 12.9 02/04/2021    LYMPHOPCT 35.4 02/04/2021    MONOPCT 8.5 02/04/2021    BASOPCT 0.6 02/04/2021    MONOSABS 0.57 02/04/2021    LYMPHSABS 2.37 02/04/2021    EOSABS 0.07 02/04/2021    BASOSABS 0.04 02/04/2021     CMP:    Lab Results   Component Value Date     02/04/2021    K 4.1 02/04/2021     02/04/2021    CO2 23 02/04/2021    BUN 12 02/04/2021    CREATININE 0.8 02/04/2021    GFRAA >60 02/04/2021    LABGLOM >60 02/04/2021    GLUCOSE 116 06/29/2021    PROT 7.7 02/04/2021    LABALBU 4.5 02/04/2021    CALCIUM 9.7 02/04/2021    BILITOT 0.4 02/04/2021    ALKPHOS 49 02/04/2021    AST 16 02/04/2021    ALT 13 02/04/2021     U/A:  No results found for: NITRITE, COLORU, PROTEINU, PHUR, LABCAST, WBCUA, RBCUA, MUCUS, TRICHOMONAS, YEAST, BACTERIA, CLARITYU, SPECGRAV, LEUKOCYTESUR, UROBILINOGEN, BILIRUBINUR, BLOODU, GLUCOSEU, AMORPHOUS  HgBA1c:    Lab Results   Component Value Date    LABA1C 4.9 10/28/2019     FLP:  No results found for: TRIG, HDL, LDLCALC, LDLDIRECT, LABVLDL  TSH:    Lab Results   Component Value Date    TSH 2.370 02/04/2021        Assessment and Plan:  Kimberly Mata was seen today for migraine. Diagnoses and all orders for this visit:    Migraine without aura and without status migrainosus, not intractable  -     topiramate (TOPAMAX) 50 MG tablet; Take 1 tablet by mouth 2 times daily  Well controlled on Topamax. Only 1-2 migraines in the last 6 months.   \"best medicine i've used for migraines\"    Generalized anxiety disorder  Stable    Seasonal affective disorder (HCC)  -     buPROPion (WELLBUTRIN XL) 150 MG extended release tablet; Take 1 tablet by mouth every morning  Will add Wellbutrin for depressive symptoms. Cystic acne  Will be starting Accutane through derm    Mild intermittent asthma without complication  -     albuterol sulfate  (90 Base) MCG/ACT inhaler; Inhale 1 puff into the lungs every 4 hours as needed for Wheezing or Shortness of Breath  -     Fluticasone furoate-vilanterol (BREO ELLIPTA) 200-25 MCG/INH AEPB inhaler; Inhale 1 puff into the lungs daily  Stable        Return in about 4 weeks (around 12/8/2021), or if symptoms worsen or fail to improve, for Chronic medical conditions.       Seen By:  Rikki García DO

## 2021-11-12 LAB
SEX HORMONE BINDING GLOBULIN: 144 NMOL/L (ref 30–135)
TESTOSTERONE FREE-NONMALE: 1.4 PG/ML (ref 0.8–7.4)
TESTOSTERONE TOTAL: 23 NG/DL (ref 20–70)

## 2021-11-13 LAB — DHEAS (DHEA SULFATE): 167 UG/DL (ref 65–380)

## 2021-11-15 LAB — 17-OH PROGESTERONE LCMS: 37.89 NG/DL

## 2021-11-16 ENCOUNTER — TELEPHONE (OUTPATIENT)
Dept: PRIMARY CARE CLINIC | Age: 21
End: 2021-11-16

## 2021-11-16 DIAGNOSIS — R79.89 ELEVATED TESTOSTERONE LEVEL: ICD-10-CM

## 2021-11-16 DIAGNOSIS — L70.0 CYSTIC ACNE: ICD-10-CM

## 2021-11-16 DIAGNOSIS — R79.89 ELEVATED CORTISOL LEVEL: Primary | ICD-10-CM

## 2021-11-16 LAB
HCG TUMOR MARKER: NORMAL IU/L (ref 0–5)
Lab: NORMAL
REPORT: NORMAL
THIS TEST SENT TO: NORMAL

## 2021-11-16 NOTE — TELEPHONE ENCOUNTER
Patient's mom calling regarding lab results from [facility]. Pt is concerned about abnormal levels. Pt would like dr Yue Simpson to call mom (on HIPAA) to discuss the elevated results.

## 2021-11-17 NOTE — TELEPHONE ENCOUNTER
Discussed results with mom. Will start with repeat labs (at 8 AM). If still elevated, will send to Endocrine.

## 2021-11-22 DIAGNOSIS — L70.0 CYSTIC ACNE: ICD-10-CM

## 2021-11-22 DIAGNOSIS — R79.89 ELEVATED CORTISOL LEVEL: ICD-10-CM

## 2021-11-22 DIAGNOSIS — R79.89 ELEVATED TESTOSTERONE LEVEL: ICD-10-CM

## 2021-11-22 LAB
CORTISOL TOTAL: 24.19 MCG/DL (ref 2.68–18.4)
HCG QUALITATIVE: NEGATIVE

## 2021-11-23 ENCOUNTER — TELEPHONE (OUTPATIENT)
Dept: PRIMARY CARE CLINIC | Age: 21
End: 2021-11-23

## 2021-11-23 DIAGNOSIS — R79.89 ELEVATED MORNING SERUM CORTISOL LEVEL: Primary | ICD-10-CM

## 2021-11-23 NOTE — TELEPHONE ENCOUNTER
Patient's mom returning call to review patient's repeat labs. Please return her call at [phone]. She is aware you are currently seeing patients and is ok with waiting until you are available to speak with her.

## 2021-11-24 LAB
SEX HORMONE BINDING GLOBULIN: 93 NMOL/L (ref 30–135)
TESTOSTERONE FREE-NONMALE: 5.7 PG/ML (ref 0.8–7.4)
TESTOSTERONE TOTAL: 65 NG/DL (ref 20–70)

## 2021-11-25 LAB — ADRENOCORTICOTROPIC HORMONE: 38.2 PG/ML (ref 7.2–63.3)

## 2021-11-30 ENCOUNTER — TELEPHONE (OUTPATIENT)
Dept: PRIMARY CARE CLINIC | Age: 21
End: 2021-11-30

## 2021-11-30 DIAGNOSIS — R79.89 ELEVATED MORNING SERUM CORTISOL LEVEL: Primary | ICD-10-CM

## 2021-11-30 NOTE — TELEPHONE ENCOUNTER
I put an appt in for Dr. Izabel Braden. Don't know if he would be any quicker.   If not, [facility]

## 2021-11-30 NOTE — TELEPHONE ENCOUNTER
At last visit, patient was referred to Endocrinology, the first available appointment is not until May and wanted to know if there was someone else in the area we could maybe try to get her in with sooner.   Please advise

## 2021-12-06 ENCOUNTER — HOSPITAL ENCOUNTER (OUTPATIENT)
Age: 21
Setting detail: SPECIMEN
Discharge: HOME | End: 2021-12-06
Payer: COMMERCIAL

## 2021-12-06 PROCEDURE — 82533 TOTAL CORTISOL: CPT

## 2021-12-12 LAB
Lab: NORMAL
Lab: NORMAL
REPORT: NORMAL
REPORT: NORMAL
THIS TEST SENT TO: NORMAL
THIS TEST SENT TO: NORMAL

## 2021-12-15 ENCOUNTER — OFFICE VISIT (OUTPATIENT)
Dept: PRIMARY CARE CLINIC | Age: 21
End: 2021-12-15
Payer: COMMERCIAL

## 2021-12-15 VITALS
HEIGHT: 66 IN | HEART RATE: 91 BPM | DIASTOLIC BLOOD PRESSURE: 90 MMHG | TEMPERATURE: 99.1 F | SYSTOLIC BLOOD PRESSURE: 130 MMHG | BODY MASS INDEX: 27.35 KG/M2 | OXYGEN SATURATION: 100 % | WEIGHT: 170.2 LBS

## 2021-12-15 DIAGNOSIS — F41.1 GENERALIZED ANXIETY DISORDER: Primary | ICD-10-CM

## 2021-12-15 DIAGNOSIS — F33.8 SEASONAL AFFECTIVE DISORDER (HCC): ICD-10-CM

## 2021-12-15 DIAGNOSIS — R79.89 ABNORMAL CORTISOL LEVEL: ICD-10-CM

## 2021-12-15 DIAGNOSIS — Z23 NEED FOR INFLUENZA VACCINATION: ICD-10-CM

## 2021-12-15 PROCEDURE — 90674 CCIIV4 VAC NO PRSV 0.5 ML IM: CPT | Performed by: FAMILY MEDICINE

## 2021-12-15 PROCEDURE — 90471 IMMUNIZATION ADMIN: CPT | Performed by: FAMILY MEDICINE

## 2021-12-15 PROCEDURE — 99214 OFFICE O/P EST MOD 30 MIN: CPT | Performed by: FAMILY MEDICINE

## 2021-12-15 PROCEDURE — G8482 FLU IMMUNIZE ORDER/ADMIN: HCPCS | Performed by: FAMILY MEDICINE

## 2021-12-15 PROCEDURE — 1036F TOBACCO NON-USER: CPT | Performed by: FAMILY MEDICINE

## 2021-12-15 PROCEDURE — G8427 DOCREV CUR MEDS BY ELIG CLIN: HCPCS | Performed by: FAMILY MEDICINE

## 2021-12-15 PROCEDURE — G8419 CALC BMI OUT NRM PARAM NOF/U: HCPCS | Performed by: FAMILY MEDICINE

## 2021-12-15 RX ORDER — CITALOPRAM 40 MG/1
40 TABLET ORAL DAILY
Qty: 90 TABLET | Refills: 1 | Status: SHIPPED
Start: 2021-12-15 | End: 2022-08-09 | Stop reason: SDUPTHER

## 2021-12-15 SDOH — ECONOMIC STABILITY: FOOD INSECURITY: WITHIN THE PAST 12 MONTHS, YOU WORRIED THAT YOUR FOOD WOULD RUN OUT BEFORE YOU GOT MONEY TO BUY MORE.: NEVER TRUE

## 2021-12-15 SDOH — ECONOMIC STABILITY: FOOD INSECURITY: WITHIN THE PAST 12 MONTHS, THE FOOD YOU BOUGHT JUST DIDN'T LAST AND YOU DIDN'T HAVE MONEY TO GET MORE.: NEVER TRUE

## 2021-12-15 ASSESSMENT — ENCOUNTER SYMPTOMS
COUGH: 0
EYE DISCHARGE: 0
BACK PAIN: 0
CONSTIPATION: 0
WHEEZING: 0
DIARRHEA: 0
NAUSEA: 0
PHOTOPHOBIA: 0
ABDOMINAL DISTENTION: 0
ABDOMINAL PAIN: 0
VOMITING: 0
SHORTNESS OF BREATH: 0

## 2021-12-15 ASSESSMENT — SOCIAL DETERMINANTS OF HEALTH (SDOH): HOW HARD IS IT FOR YOU TO PAY FOR THE VERY BASICS LIKE FOOD, HOUSING, MEDICAL CARE, AND HEATING?: NOT HARD AT ALL

## 2021-12-15 NOTE — PROGRESS NOTES
12/15/21  Paola Lew : 2000 Sex: female  Age: 22 y.o. Chief Complaint   Patient presents with    Discuss Medications     seeing endocrinology and cortisol levels have been off     Insomnia     HPI:  22 y.o. female presents today for 1 month follow up of chronic medical conditions, medication refills. Patient's chart, medical, surgical and medication history all reviewed. Migraine  Patient complains of migraine headaches. She does not have a headache at this time. Description of Headaches:  Location of pain: left-sided unilateral  Radiation of pain?:none  Character of pain:aching and throbbing  Accompanying symptoms: nausea, vomiting, sonophobia, photophobia  Prodromal sx?: pain behind eyes  Typical precipitants: unknown    Temporal Pattern of Headaches:  Started having HAs 2 year ago  Worst time of day: any time  Awaken from sleep?: no, but will wake up in the morning with a headache. Seasonal pattern?: no  Clustering of HAs over time? no  Change with menstrual cycle?: no  Overall pattern since problem began: gradually improving    Degree of Functional Impairment: moderate    Current Use of Meds to Treat HA:  Abortive meds? Imitrex   Daily use? no  Prophylactic meds? Topamax    Previous Meds Tried for Treatment of HA:  Beta blockers:  Metoprolol (caused fatigue)  Calcium channel blockers:  none  Anti-convulsants:  topamax  Triptans/Others:  Ibuprofen and Sumatriptan  oral (Imitrex )  Botox injections: no      Additional Relevant History:  History of head/neck trauma? no  History of head/neck surgery? no  Family h/o headache problems? no  Use of meds that might worsen HAs? no  Exposure to carbon monoxide? no  Substance use: alcohol: no, illicit drugs: no, tobacco: no, caffeine: no    Anxiety/Depression  Patient complains of evaluation of anxiety disorder and sleep disturbance.   She has the following anxiety symptoms: difficulty concentrating, fatigue, feelings of losing control, racing thoughts, hypersomnia. Onset of symptoms was approximately several years ago, gradually worsening since that time. She denies current suicidal and homicidal ideation. Previous treatment includes Celexa, Lexapro and Wellbutrin and no therapy. She complains of the following side effects from the treatment: none. Patient noting improvement in anxiety, but having issues with depression. Feels like the weather causes her to feel more down. She was started on Wellbutrin last OV, but now having increased anxiety and vivid dreams. Elevated cortisol  Patient had labs drawn by derm for acne. Demonstrated elevated cortisol levels. Redraw again demonstrated elevated levels. Seen by Endo and had saliva testing which was normal.  Patient concerned about difficulty losing weight. ROS:  Review of Systems   Constitutional: Negative for chills, fatigue and fever. Eyes: Negative for photophobia, discharge and visual disturbance. Respiratory: Negative for cough, shortness of breath and wheezing. Cardiovascular: Negative for chest pain and palpitations. Gastrointestinal: Negative for abdominal distention, abdominal pain, constipation, diarrhea, nausea and vomiting. Musculoskeletal: Negative for arthralgias and back pain. Skin: Negative for rash. Neurological: Positive for headaches. Negative for dizziness. Psychiatric/Behavioral: Positive for sleep disturbance. Negative for dysphoric mood. The patient is nervous/anxious. All other systems reviewed and are negative.        Current Outpatient Medications:     citalopram (CELEXA) 40 MG tablet, Take 1 tablet by mouth daily, Disp: 90 tablet, Rfl: 1    albuterol sulfate HFA (VENTOLIN HFA) 108 (90 Base) MCG/ACT inhaler, Inhale 2 puffs into the lungs every 6 hours as needed for Wheezing, Disp: , Rfl:     Fluticasone furoate-vilanterol (BREO ELLIPTA) 200-25 MCG/INH AEPB inhaler, Inhale 1 puff into the lungs daily, Disp: 3 each, Rfl: 1    topiramate (TOPAMAX) 50 MG tablet, Take 1 tablet by mouth 2 times daily, Disp: 60 tablet, Rfl: 5    SUMAtriptan (IMITREX) 50 MG tablet, Take 50 mg by mouth once as needed for Migraine, Disp: , Rfl:     Cholecalciferol (VITAMIN D3) 125 MCG (5000 UT) TABS, Take 5,000 Units by mouth daily, Disp: , Rfl:     norethindrone-ethinyl estradiol (JUNEL FE 1/20) 1-20 MG-MCG per tablet, Take 1 tablet by mouth daily, Disp: 84 tablet, Rfl: 3    minocycline (MINOCIN;DYNACIN) 100 MG capsule, Take 100 mg by mouth daily, Disp: , Rfl:     No Known Allergies    History reviewed. No pertinent past medical history. History reviewed. No pertinent surgical history. History reviewed. No pertinent family history. Social History     Socioeconomic History    Marital status: Single     Spouse name: Not on file    Number of children: Not on file    Years of education: Not on file    Highest education level: Not on file   Occupational History    Not on file   Tobacco Use    Smoking status: Never Smoker    Smokeless tobacco: Never Used   Vaping Use    Vaping Use: Never used   Substance and Sexual Activity    Alcohol use: Not on file    Drug use: Not on file    Sexual activity: Not on file   Other Topics Concern    Not on file   Social History Narrative    Not on file     Social Determinants of Health     Financial Resource Strain: Low Risk     Difficulty of Paying Living Expenses: Not hard at all   Food Insecurity: No Food Insecurity    Worried About [address] in the Last Year: Never true    [address] in the Last Year: Never true   Transportation Needs:     Lack of Transportation (Medical): Not on file    Lack of Transportation (Non-Medical):  Not on file   Physical Activity:     Days of Exercise per Week: Not on file    Minutes of Exercise per Session: Not on file   Stress:     Feeling of Stress : Not on file   Social Connections:     Frequency of Communication with Friends and Family: Not on file    Frequency of Social Gatherings with Friends and Family: Not on file    Attends Faith Services: Not on file    Active Member of Clubs or Organizations: Not on file    Attends Club or Organization Meetings: Not on file    Marital Status: Not on file   Intimate Partner Violence:     Fear of Current or Ex-Partner: Not on file    Emotionally Abused: Not on file    Physically Abused: Not on file    Sexually Abused: Not on file   Housing Stability:     Unable to Pay for Housing in the Last Year: Not on file    Number of [place] in the Last Year: Not on file    Unstable Housing in the Last Year: Not on file       Vitals:    12/15/21 0912   BP: (!) 130/90   Pulse: 91   Temp: 99.1 °F (37.3 °C)   SpO2: 100%   Weight: 170 lb 3.2 oz (77.2 kg)   Height: 5' 6\" (1.676 m)       Physical Exam:  Physical Exam  Vitals and nursing note reviewed. Constitutional:       General: She is not in acute distress. Appearance: Normal appearance. She is well-developed and normal weight. She is not ill-appearing. HENT:      Head: Normocephalic and atraumatic. Right Ear: External ear normal.      Left Ear: External ear normal.      Nose:      Comments: Wearing mask  Eyes:      General: No scleral icterus. Extraocular Movements: Extraocular movements intact. Conjunctiva/sclera: Conjunctivae normal.   Neck:      Thyroid: No thyromegaly. Cardiovascular:      Rate and Rhythm: Normal rate and regular rhythm. Heart sounds: Normal heart sounds. No murmur heard. Pulmonary:      Effort: Pulmonary effort is normal. No respiratory distress. Breath sounds: Normal breath sounds. No wheezing. Musculoskeletal:         General: No tenderness or deformity. Normal range of motion. Cervical back: Normal range of motion and neck supple. Right lower leg: No edema. Left lower leg: No edema. Lymphadenopathy:      Cervical: No cervical adenopathy. Skin:     General: Skin is warm and dry. Findings: No rash. Neurological:      General: No focal deficit present. Mental Status: She is alert and oriented to person, place, and time. Gait: Gait normal.   Psychiatric:         Mood and Affect: Mood normal.         Speech: Speech normal.         Behavior: Behavior normal.         Thought Content: Thought content normal.       Labs  CBC with Differential:    Lab Results   Component Value Date    WBC 5.6 11/10/2021    RBC 4.75 11/10/2021    HGB 13.7 11/10/2021    HCT 41.2 11/10/2021     11/10/2021    MCV 86.7 11/10/2021    MCH 28.8 11/10/2021    MCHC 33.3 11/10/2021    RDW 12.7 11/10/2021    LYMPHOPCT 32.6 11/10/2021    MONOPCT 7.7 11/10/2021    BASOPCT 0.7 11/10/2021    MONOSABS 0.43 11/10/2021    LYMPHSABS 1.81 11/10/2021    EOSABS 0.05 11/10/2021    BASOSABS 0.04 11/10/2021     CMP:    Lab Results   Component Value Date     02/04/2021    K 4.1 02/04/2021     02/04/2021    CO2 23 02/04/2021    BUN 12 02/04/2021    CREATININE 0.8 02/04/2021    GFRAA >60 02/04/2021    LABGLOM >60 02/04/2021    GLUCOSE 116 06/29/2021    PROT 8.0 11/10/2021    LABALBU 4.5 11/10/2021    CALCIUM 9.7 02/04/2021    BILITOT 0.2 11/10/2021    ALKPHOS 56 11/10/2021    AST 16 11/10/2021    ALT 14 11/10/2021     U/A:  No results found for: NITRITE, COLORU, PROTEINU, PHUR, LABCAST, WBCUA, RBCUA, MUCUS, TRICHOMONAS, YEAST, BACTERIA, CLARITYU, SPECGRAV, LEUKOCYTESUR, UROBILINOGEN, BILIRUBINUR, BLOODU, GLUCOSEU, AMORPHOUS  HgBA1c:    Lab Results   Component Value Date    LABA1C 4.9 10/28/2019     FLP:    Lab Results   Component Value Date    TRIG 93 11/10/2021    HDL 49 11/10/2021    LDLCALC 115 11/10/2021    LABVLDL 19 11/10/2021     TSH:    Lab Results   Component Value Date    TSH 1.080 11/10/2021        Assessment and Plan:  Jerica Machado was seen today for discuss medications and insomnia. Diagnoses and all orders for this visit:    Generalized anxiety disorder  -     citalopram (CELEXA) 40 MG tablet;  Take 1 tablet by mouth daily  Will stop Wellbutrin and just try increasing Celexa at this time. [facility]  -     citalopram (CELEXA) 40 MG tablet; Take 1 tablet by mouth daily    Abnormal cortisol level  Following with Endo. Normal saliva testing. Waiting on next steps. Need for influenza vaccination  -     INFLUENZA, MDCK QUADV, 2 YRS AND OLDER, IM, PF, PREFILL SYR OR SDV, 0.5ML (FLUCELVAX QUADV, PF)          Return in about 8 weeks (around 2/9/2022), or if symptoms worsen or fail to improve, for Chronic medical conditions.       Seen By:  Nena Antonio, DO

## 2022-01-12 ENCOUNTER — OFFICE VISIT (OUTPATIENT)
Dept: ENDOCRINOLOGY | Age: 22
End: 2022-01-12
Payer: COMMERCIAL

## 2022-01-12 VITALS
HEIGHT: 66 IN | RESPIRATION RATE: 16 BRPM | WEIGHT: 172.6 LBS | DIASTOLIC BLOOD PRESSURE: 70 MMHG | HEART RATE: 78 BPM | SYSTOLIC BLOOD PRESSURE: 116 MMHG | BODY MASS INDEX: 27.74 KG/M2

## 2022-01-12 DIAGNOSIS — R79.89 ELEVATED CORTISOL LEVEL: Primary | ICD-10-CM

## 2022-01-12 PROCEDURE — G8482 FLU IMMUNIZE ORDER/ADMIN: HCPCS | Performed by: CLINICAL NURSE SPECIALIST

## 2022-01-12 PROCEDURE — 99204 OFFICE O/P NEW MOD 45 MIN: CPT | Performed by: CLINICAL NURSE SPECIALIST

## 2022-01-12 PROCEDURE — G8427 DOCREV CUR MEDS BY ELIG CLIN: HCPCS | Performed by: CLINICAL NURSE SPECIALIST

## 2022-01-12 PROCEDURE — G8419 CALC BMI OUT NRM PARAM NOF/U: HCPCS | Performed by: CLINICAL NURSE SPECIALIST

## 2022-01-12 PROCEDURE — 1036F TOBACCO NON-USER: CPT | Performed by: CLINICAL NURSE SPECIALIST

## 2022-01-12 NOTE — PROGRESS NOTES
[address] [facility] Department of Endocrinology Diabetes and Metabolism   [address] [facility] [address]   Phone: [phone]  Fax: [phone]    Date of Service: 1/12/2022    Primary Care Physician: Malinda Alicea DO  Referring physician: Harman Alvarez DO  Provider: GURVINDER Hanna     Reason for the visit:  Elevated am cortisol         History of Present Illness: The history is provided by the patient. No  was used. Accuracy of the patient data is excellent. Ponce Stephens is a very pleasant 22 y.o. female seen today for elevated am cortisol     Dx: 11/2021 Cortisol 19.77  Context:  Pt went to dermatology for acne amd cortisol level was drawn   Previous labs: 17 OHP 37.89, DHEA-S 167, FSH 5.4, LH 5.6, testosterone 65, free testosterone 5.7, TSH 1.08, free T4 0.94 (All WNL)   Cortisol 19.7 repeat  24.19 (11/21)  Midnight saliva cortisol testing negative x 2   0.028 (11/21) . 035 (12/21)  No hypokalemia   No hx of osteoporosis   Denies hirsutism  Patient is on OCP  No central obesity  Denies easy bruising, proximal myopathies, striae                PAST MEDICAL HISTORY   No past medical history on file. PAST SURGICAL HISTORY   No past surgical history on file. SOCIAL HISTORY   Tobacco:   reports that she has never smoked. She has never used smokeless tobacco.  Alcohol:   has no history on file for alcohol use. Drugs:   has no history on file for drug use. FAMILY HISTORY   No family history on file.     ALLERGIES AND DRUG REACTIONS   No Known Allergies    CURRENT MEDICATIONS   Current Outpatient Medications   Medication Sig Dispense Refill    citalopram (CELEXA) 40 MG tablet Take 1 tablet by mouth daily 90 tablet 1    albuterol sulfate HFA (VENTOLIN HFA) 108 (90 Base) MCG/ACT inhaler Inhale 2 puffs into the lungs every 6 hours as needed for Wheezing      Fluticasone furoate-vilanterol (BREO ELLIPTA) 200-25 MCG/INH AEPB inhaler Inhale 1 puff into the lungs daily 3 each 1    topiramate (TOPAMAX) 50 MG tablet Take 1 tablet by mouth 2 times daily 60 tablet 5    SUMAtriptan (IMITREX) 50 MG tablet Take 50 mg by mouth once as needed for Migraine      Cholecalciferol (VITAMIN D3) 125 MCG (5000 UT) TABS Take 5,000 Units by mouth daily      norethindrone-ethinyl estradiol (JUNEL FE 1/20) 1-20 MG-MCG per tablet Take 1 tablet by mouth daily 84 tablet 3    minocycline (MINOCIN;DYNACIN) 100 MG capsule Take 100 mg by mouth daily       No current facility-administered medications for this visit. Review of Systems  Constitutional: No fever, no chills, no diaphoresis, no generalized weakness. HEENT: No blurred vision, No sore throat, no ear pain, no hair loss  Neck: denied any neck swelling, difficulty swallowing,   Cardio-pulmonary: No CP, SOB or palpitation, No orthopnea or PND. No cough or wheezing. GI: No N/V/D, no constipation, No abdominal pain, no melena or hematochezia   : Denied any dysuria, hematuria, flank pain, discharge, or incontinence. Skin: denied any rash, ulcer, Hirsute, or hyperpigmentation. MSK: denied any joint deformity, joint pain/swelling, muscle pain, or back pain. Neuro: no numbness, no tingling, no weakness, _    OBJECTIVE    /70   Pulse 78   Resp 16   Ht 5' 6\" (1.676 m)   Wt 172 lb 9.6 oz (78.3 kg)   BMI 27.86 kg/m²   BP Readings from Last 4 Encounters:   01/12/22 116/70   12/15/21 (!) 130/90   11/10/21 122/80   06/29/21 118/80     Wt Readings from Last 6 Encounters:   01/12/22 172 lb 9.6 oz (78.3 kg)   12/15/21 170 lb 3.2 oz (77.2 kg)   11/10/21 170 lb (77.1 kg)   06/29/21 165 lb (74.8 kg) (90 %, Z= 1.26)*   03/29/21 161 lb (73 kg) (88 %, Z= 1.18)*   02/04/21 159 lb (72.1 kg) (87 %, Z= 1.14)*     * Growth percentiles are based on CDC (Girls, 2-20 Years) data. Physical examination:  General: awake alert, oriented x3, no abnormal position or movements.   HEENT: normocephalic non-traumatic, no exophthalmos + facial fullness, no dorsal fat pad  Neck: supple, no LN enlargement, no thyromegaly, no thyroid tenderness, no JVD. Pulm: Clear equal air entry no added sounds, no wheezing or rhonchi    CVS: S1 + S2, no murmur, no heave. Dorsalis pedis pulse palpable   Abd: soft lax, no tenderness, no organomegaly, audible bowel sounds. No purple striae  Skin: warm, no lesions, no rash. No callus, no Ulcers, No acanthosis nigricans  Musculoskeletal: No back tenderness, no kyphosis/scoliosis    Neuro: CN intact, , muscle power normal  Psych: normal mood, and affect      Review of Laboratory Data:  I personally reviewed the following lab:  Lab Results   Component Value Date/Time    WBC 5.6 11/10/2021 10:17 AM    RBC 4.75 11/10/2021 10:17 AM    HGB 13.7 11/10/2021 10:17 AM    HCT 41.2 11/10/2021 10:17 AM    MCV 86.7 11/10/2021 10:17 AM    MCH 28.8 11/10/2021 10:17 AM    MCHC 33.3 11/10/2021 10:17 AM    RDW 12.7 11/10/2021 10:17 AM     11/10/2021 10:17 AM    MPV 9.1 11/10/2021 10:17 AM      Lab Results   Component Value Date/Time     02/04/2021 09:28 AM    K 4.1 02/04/2021 09:28 AM    CO2 23 02/04/2021 09:28 AM    BUN 12 02/04/2021 09:28 AM    CREATININE 0.8 02/04/2021 09:28 AM    CALCIUM 9.7 02/04/2021 09:28 AM    LABGLOM >60 02/04/2021 09:28 AM    GFRAA >60 02/04/2021 09:28 AM      Lab Results   Component Value Date/Time    TSH 1.080 11/10/2021 10:17 AM    T4FREE 0.94 11/10/2021 10:17 AM     Lab Results   Component Value Date    LABA1C 4.9 10/28/2019    GLUCOSE 116 06/29/2021     Lab Results   Component Value Date    LABA1C 4.9 10/28/2019     Lab Results   Component Value Date    TRIG 93 11/10/2021    HDL 49 11/10/2021    LDLCALC 115 11/10/2021    CHOL 183 11/10/2021     Lab Results   Component Value Date    VITD25 20 02/04/2021    VITD25 17 10/28/2019       ASSESSMENT & RECOMMENDATIONS   Sofía Elena, a 22 y.o.-old female seen in for the following issues       Assessment:      Diagnosis Orders   1. Elevated cortisol level         Plan:     1. Elevated cortisol level      Patient with elevated a.m. cortisol level drawn by dermatology d/t acne   Previous labs reviewed   Midnight saliva cortisol negative x 2   Cushing's disease has been ruled out  I do not recommend 1 mg dexamethasone suppression test as patient is on OCP which can lead to false positive  I encouraged diet and exercise  Will see as needed               I personally spent > 45 minutes reviewed external notes from PCP and other patient's care team providers, and personally interpreted labs associated with the above diagnosis. No follow-ups on file. The above issues were reviewed with the patient who understood and agreed with the plan. Thank you for allowing us to participate in the care of this patient. Please do not hesitate to contact us with any additional questions. GURVINDER Chowdhury     [facility] Diabetes Care and Endocrinology   [address] [place] [id number]   Phone: [phone]  Fax: [phone]  --------------------------------------------  An electronic signature was used to authenticate this note.  Antwon MCGREGOR on 1/12/2022 at 1:46 PM

## 2022-01-28 LAB — HCG QUALITATIVE: NEGATIVE

## 2022-02-08 ENCOUNTER — OFFICE VISIT (OUTPATIENT)
Dept: PRIMARY CARE CLINIC | Age: 22
End: 2022-02-08
Payer: COMMERCIAL

## 2022-02-08 VITALS
OXYGEN SATURATION: 98 % | BODY MASS INDEX: 27.48 KG/M2 | TEMPERATURE: 98.2 F | DIASTOLIC BLOOD PRESSURE: 76 MMHG | SYSTOLIC BLOOD PRESSURE: 114 MMHG | HEART RATE: 73 BPM | HEIGHT: 66 IN | WEIGHT: 171 LBS

## 2022-02-08 DIAGNOSIS — L70.0 CYSTIC ACNE: ICD-10-CM

## 2022-02-08 DIAGNOSIS — F41.1 GENERALIZED ANXIETY DISORDER: Primary | ICD-10-CM

## 2022-02-08 DIAGNOSIS — F33.8 SEASONAL AFFECTIVE DISORDER (HCC): ICD-10-CM

## 2022-02-08 DIAGNOSIS — G43.009 MIGRAINE WITHOUT AURA AND WITHOUT STATUS MIGRAINOSUS, NOT INTRACTABLE: ICD-10-CM

## 2022-02-08 PROCEDURE — 99214 OFFICE O/P EST MOD 30 MIN: CPT | Performed by: FAMILY MEDICINE

## 2022-02-08 PROCEDURE — G8482 FLU IMMUNIZE ORDER/ADMIN: HCPCS | Performed by: FAMILY MEDICINE

## 2022-02-08 PROCEDURE — G8419 CALC BMI OUT NRM PARAM NOF/U: HCPCS | Performed by: FAMILY MEDICINE

## 2022-02-08 PROCEDURE — G8427 DOCREV CUR MEDS BY ELIG CLIN: HCPCS | Performed by: FAMILY MEDICINE

## 2022-02-08 PROCEDURE — 1036F TOBACCO NON-USER: CPT | Performed by: FAMILY MEDICINE

## 2022-02-08 ASSESSMENT — ENCOUNTER SYMPTOMS
NAUSEA: 0
EYE DISCHARGE: 0
BACK PAIN: 0
ABDOMINAL PAIN: 0
WHEEZING: 0
DIARRHEA: 0
SHORTNESS OF BREATH: 0
PHOTOPHOBIA: 0
CONSTIPATION: 0
VOMITING: 0
ABDOMINAL DISTENTION: 0
COUGH: 0

## 2022-02-08 ASSESSMENT — PATIENT HEALTH QUESTIONNAIRE - PHQ9
SUM OF ALL RESPONSES TO PHQ QUESTIONS 1-9: 2
SUM OF ALL RESPONSES TO PHQ QUESTIONS 1-9: 2
6. FEELING BAD ABOUT YOURSELF - OR THAT YOU ARE A FAILURE OR HAVE LET YOURSELF OR YOUR FAMILY DOWN: 0
5. POOR APPETITE OR OVEREATING: 0
3. TROUBLE FALLING OR STAYING ASLEEP: 1
SUM OF ALL RESPONSES TO PHQ QUESTIONS 1-9: 2
8. MOVING OR SPEAKING SO SLOWLY THAT OTHER PEOPLE COULD HAVE NOTICED. OR THE OPPOSITE, BEING SO FIGETY OR RESTLESS THAT YOU HAVE BEEN MOVING AROUND A LOT MORE THAN USUAL: 0
1. LITTLE INTEREST OR PLEASURE IN DOING THINGS: 0
10. IF YOU CHECKED OFF ANY PROBLEMS, HOW DIFFICULT HAVE THESE PROBLEMS MADE IT FOR YOU TO DO YOUR WORK, TAKE CARE OF THINGS AT HOME, OR GET ALONG WITH OTHER PEOPLE: 0
SUM OF ALL RESPONSES TO PHQ9 QUESTIONS 1 & 2: 0
9. THOUGHTS THAT YOU WOULD BE BETTER OFF DEAD, OR OF HURTING YOURSELF: 0
2. FEELING DOWN, DEPRESSED OR HOPELESS: 0
7. TROUBLE CONCENTRATING ON THINGS, SUCH AS READING THE NEWSPAPER OR WATCHING TELEVISION: 0
4. FEELING TIRED OR HAVING LITTLE ENERGY: 1
SUM OF ALL RESPONSES TO PHQ QUESTIONS 1-9: 2

## 2022-02-08 NOTE — PROGRESS NOTES
22  [place]lin Creek : 2000 Sex: female  Age: 22 y.o. Chief Complaint   Patient presents with    Anxiety     f/u - new dose of celexa is working      HPI:  22 y.o. female presents today for 6 week follow up of chronic medical conditions, medication refills. Patient's chart, medical, surgical and medication history all reviewed. Migraine  Patient complains of migraine headaches. She does not have a headache at this time. Description of Headaches:  Location of pain: left-sided unilateral  Radiation of pain?:none  Character of pain:aching and throbbing  Accompanying symptoms: nausea, vomiting, sonophobia, photophobia  Prodromal sx?: pain behind eyes  Typical precipitants: unknown    Temporal Pattern of Headaches:  Started having HAs 2 year ago  Worst time of day: any time  Awaken from sleep?: no, but will wake up in the morning with a headache. Seasonal pattern?: no  Clustering of HAs over time? no  Change with menstrual cycle?: no  Overall pattern since problem began: gradually improving    Degree of Functional Impairment: moderate    Current Use of Meds to Treat HA:  Abortive meds? Imitrex   Daily use? no  Prophylactic meds? Topamax    Previous Meds Tried for Treatment of HA:  Beta blockers:  Metoprolol (caused fatigue)  Calcium channel blockers:  none  Anti-convulsants:  topamax  Triptans/Others:  Ibuprofen and Sumatriptan  oral (Imitrex )  Botox injections: no      Additional Relevant History:  History of head/neck trauma? no  History of head/neck surgery? no  Family h/o headache problems? no  Use of meds that might worsen HAs? no  Exposure to carbon monoxide? no  Substance use: alcohol: no, illicit drugs: no, tobacco: no, caffeine: no    Anxiety/Depression  Patient complains of evaluation of anxiety disorder and sleep disturbance. She has the following anxiety symptoms: difficulty concentrating, fatigue, feelings of losing control, racing thoughts, hypersomnia.  Onset of symptoms was approximately several years ago, gradually worsening since that time. She denies current suicidal and homicidal ideation. Previous treatment includes Celexa, Lexapro and Wellbutrin and no therapy. She complains of the following side effects from the treatment: none. Patient noting improvement in moods overall with increase in Celexa. Feels better now than when she was taking Wellbutrin. Elevated cortisol  Patient had labs drawn by derm for acne. Demonstrated elevated cortisol levels. Redraw again demonstrated elevated levels. Seen by Endo and had saliva testing which was normal.  Had second opinion and told no concern. Will start Accutane soon. ROS:  Review of Systems   Constitutional: Negative for chills, fatigue and fever. Eyes: Negative for photophobia, discharge and visual disturbance. Respiratory: Negative for cough, shortness of breath and wheezing. Cardiovascular: Negative for chest pain and palpitations. Gastrointestinal: Negative for abdominal distention, abdominal pain, constipation, diarrhea, nausea and vomiting. Musculoskeletal: Negative for arthralgias and back pain. Skin: Negative for rash. Neurological: Positive for headaches. Negative for dizziness. Psychiatric/Behavioral: Positive for sleep disturbance. Negative for dysphoric mood. The patient is nervous/anxious. All other systems reviewed and are negative.        Current Outpatient Medications:     citalopram (CELEXA) 40 MG tablet, Take 1 tablet by mouth daily, Disp: 90 tablet, Rfl: 1    albuterol sulfate HFA (VENTOLIN HFA) 108 (90 Base) MCG/ACT inhaler, Inhale 2 puffs into the lungs every 6 hours as needed for Wheezing, Disp: , Rfl:     Fluticasone furoate-vilanterol (BREO ELLIPTA) 200-25 MCG/INH AEPB inhaler, Inhale 1 puff into the lungs daily, Disp: 3 each, Rfl: 1    topiramate (TOPAMAX) 50 MG tablet, Take 1 tablet by mouth 2 times daily, Disp: 60 tablet, Rfl: 5    SUMAtriptan (IMITREX) 50 MG tablet, Take 50 mg by mouth once as needed for Migraine, Disp: , Rfl:     Cholecalciferol (VITAMIN D3) 125 MCG (5000 UT) TABS, Take 5,000 Units by mouth daily, Disp: , Rfl:     norethindrone-ethinyl estradiol (JUNEL FE 1/20) 1-20 MG-MCG per tablet, Take 1 tablet by mouth daily, Disp: 84 tablet, Rfl: 3    minocycline (MINOCIN;DYNACIN) 100 MG capsule, Take 100 mg by mouth daily, Disp: , Rfl:     No Known Allergies    History reviewed. No pertinent past medical history. History reviewed. No pertinent surgical history. History reviewed. No pertinent family history. Social History     Socioeconomic History    Marital status: Single     Spouse name: Not on file    Number of children: Not on file    Years of education: Not on file    Highest education level: Not on file   Occupational History    Not on file   Tobacco Use    Smoking status: Never Smoker    Smokeless tobacco: Never Used   Vaping Use    Vaping Use: Never used   Substance and Sexual Activity    Alcohol use: Not on file    Drug use: Not on file    Sexual activity: Not on file   Other Topics Concern    Not on file   Social History Narrative    Not on file     Social Determinants of Health     Financial Resource Strain: Low Risk     Difficulty of Paying Living Expenses: Not hard at all   Food Insecurity: No Food Insecurity    Worried About [address] in the Last Year: Never true    [address] in the Last Year: Never true   Transportation Needs:     Lack of Transportation (Medical): Not on file    Lack of Transportation (Non-Medical):  Not on file   Physical Activity:     Days of Exercise per Week: Not on file    Minutes of Exercise per Session: Not on file   Stress:     Feeling of Stress : Not on file   Social Connections:     Frequency of Communication with Friends and Family: Not on file    Frequency of Social Gatherings with Friends and Family: Not on file    Attends Jainism Services: Not on file   [employer]s or Organizations: Not on file    Attends Club or Organization Meetings: Not on file    Marital Status: Not on file   Intimate Partner Violence:     Fear of Current or Ex-Partner: Not on file    Emotionally Abused: Not on file    Physically Abused: Not on file    Sexually Abused: Not on file   Housing Stability:     Unable to Pay for Housing in the Last Year: Not on file    Number of [place] in the Last Year: Not on file    Unstable Housing in the Last Year: Not on file       Vitals:    02/08/22 1453   BP: 114/76   Pulse: 73   Temp: 98.2 °F (36.8 °C)   SpO2: 98%   Weight: 171 lb (77.6 kg)   Height: 5' 6\" (1.676 m)       Physical Exam:  Physical Exam  Vitals and nursing note reviewed. Constitutional:       General: She is not in acute distress. Appearance: Normal appearance. She is well-developed and normal weight. She is not ill-appearing. HENT:      Head: Normocephalic and atraumatic. Right Ear: External ear normal.      Left Ear: External ear normal.      Nose:      Comments: Wearing mask  Eyes:      General: No scleral icterus. Extraocular Movements: Extraocular movements intact. Conjunctiva/sclera: Conjunctivae normal.   Neck:      Thyroid: No thyromegaly. Cardiovascular:      Rate and Rhythm: Normal rate and regular rhythm. Heart sounds: Normal heart sounds. No murmur heard. Pulmonary:      Effort: Pulmonary effort is normal. No respiratory distress. Breath sounds: Normal breath sounds. No wheezing. Musculoskeletal:         General: No tenderness or deformity. Normal range of motion. Cervical back: Normal range of motion and neck supple. Right lower leg: No edema. Left lower leg: No edema. Lymphadenopathy:      Cervical: No cervical adenopathy. Skin:     General: Skin is warm and dry. Findings: No rash. Neurological:      General: No focal deficit present. Mental Status: She is alert and oriented to person, place, and time. Gait: Gait normal.   Psychiatric:         Mood and Affect: Mood normal.         Speech: Speech normal.         Behavior: Behavior normal.         Thought Content: Thought content normal.       Labs  CBC with Differential:    Lab Results   Component Value Date    WBC 5.6 11/10/2021    RBC 4.75 11/10/2021    HGB 13.7 11/10/2021    HCT 41.2 11/10/2021     11/10/2021    MCV 86.7 11/10/2021    MCH 28.8 11/10/2021    MCHC 33.3 11/10/2021    RDW 12.7 11/10/2021    LYMPHOPCT 32.6 11/10/2021    MONOPCT 7.7 11/10/2021    BASOPCT 0.7 11/10/2021    MONOSABS 0.43 11/10/2021    LYMPHSABS 1.81 11/10/2021    EOSABS 0.05 11/10/2021    BASOSABS 0.04 11/10/2021     CMP:    Lab Results   Component Value Date     02/04/2021    K 4.1 02/04/2021     02/04/2021    CO2 23 02/04/2021    BUN 12 02/04/2021    CREATININE 0.8 02/04/2021    GFRAA >60 02/04/2021    LABGLOM >60 02/04/2021    GLUCOSE 116 06/29/2021    PROT 8.0 11/10/2021    LABALBU 4.5 11/10/2021    CALCIUM 9.7 02/04/2021    BILITOT 0.2 11/10/2021    ALKPHOS 56 11/10/2021    AST 16 11/10/2021    ALT 14 11/10/2021     U/A:  No results found for: NITRITE, COLORU, PROTEINU, PHUR, LABCAST, WBCUA, RBCUA, MUCUS, TRICHOMONAS, YEAST, BACTERIA, CLARITYU, SPECGRAV, LEUKOCYTESUR, UROBILINOGEN, BILIRUBINUR, BLOODU, GLUCOSEU, AMORPHOUS  HgBA1c:    Lab Results   Component Value Date    LABA1C 4.9 10/28/2019     FLP:    Lab Results   Component Value Date    TRIG 93 11/10/2021    HDL 49 11/10/2021    LDLCALC 115 11/10/2021    LABVLDL 19 11/10/2021     TSH:    Lab Results   Component Value Date    TSH 1.080 11/10/2021        Assessment and Plan:  Naveen Alvarado was seen today for anxiety. Diagnoses and all orders for this visit:    Generalized anxiety disorder  Much better control at this time. Will continue Celexa the same at this time. Seasonal affective disorder ([facility] [address]  Improving.      Migraine without aura and without status migrainosus, not intractable  Stable    Cystic acne  Starting Accutane soon      Return in about 6 months (around 8/8/2022), or if symptoms worsen or fail to improve, for Well visit.       Seen By:  Heather Lowry, DO

## 2022-03-01 LAB
ALBUMIN SERPL-MCNC: 4.1 G/DL (ref 3.5–5.2)
ALP BLD-CCNC: 53 U/L (ref 35–104)
ALT SERPL-CCNC: 19 U/L (ref 0–32)
AST SERPL-CCNC: 23 U/L (ref 0–31)
BASOPHILS ABSOLUTE: 0.03 E9/L (ref 0–0.2)
BASOPHILS RELATIVE PERCENT: 0.4 % (ref 0–2)
BILIRUB SERPL-MCNC: 0.3 MG/DL (ref 0–1.2)
BILIRUBIN DIRECT: <0.2 MG/DL (ref 0–0.3)
BILIRUBIN, INDIRECT: NORMAL MG/DL (ref 0–1)
CHOLESTEROL, TOTAL: 166 MG/DL (ref 0–199)
EOSINOPHILS ABSOLUTE: 0.09 E9/L (ref 0.05–0.5)
EOSINOPHILS RELATIVE PERCENT: 1.3 % (ref 0–6)
HCG QUALITATIVE: NEGATIVE
HCT VFR BLD CALC: 40.7 % (ref 34–48)
HDLC SERPL-MCNC: 44 MG/DL
HEMOGLOBIN: 13.5 G/DL (ref 11.5–15.5)
IMMATURE GRANULOCYTES #: 0.01 E9/L
IMMATURE GRANULOCYTES %: 0.1 % (ref 0–5)
LDL CHOLESTEROL CALCULATED: 91 MG/DL (ref 0–99)
LYMPHOCYTES ABSOLUTE: 2.22 E9/L (ref 1.5–4)
LYMPHOCYTES RELATIVE PERCENT: 31.4 % (ref 20–42)
MCH RBC QN AUTO: 28.7 PG (ref 26–35)
MCHC RBC AUTO-ENTMCNC: 33.2 % (ref 32–34.5)
MCV RBC AUTO: 86.6 FL (ref 80–99.9)
MONOCYTES ABSOLUTE: 0.53 E9/L (ref 0.1–0.95)
MONOCYTES RELATIVE PERCENT: 7.5 % (ref 2–12)
NEUTROPHILS ABSOLUTE: 4.18 E9/L (ref 1.8–7.3)
NEUTROPHILS RELATIVE PERCENT: 59.3 % (ref 43–80)
PDW BLD-RTO: 13 FL (ref 11.5–15)
PLATELET # BLD: 327 E9/L (ref 130–450)
PMV BLD AUTO: 9.5 FL (ref 7–12)
RBC # BLD: 4.7 E12/L (ref 3.5–5.5)
TOTAL PROTEIN: 7.5 G/DL (ref 6.4–8.3)
TRIGL SERPL-MCNC: 155 MG/DL (ref 0–149)
VLDLC SERPL CALC-MCNC: 31 MG/DL
WBC # BLD: 7.1 E9/L (ref 4.5–11.5)

## 2022-03-06 LAB
Lab: NORMAL
REPORT: NORMAL
THIS TEST SENT TO: NORMAL

## 2022-03-31 LAB
ALBUMIN SERPL-MCNC: 4.4 G/DL (ref 3.5–5.2)
ALP BLD-CCNC: 52 U/L (ref 35–104)
ALT SERPL-CCNC: 13 U/L (ref 0–32)
AST SERPL-CCNC: 21 U/L (ref 0–31)
BASOPHILS ABSOLUTE: 0.04 E9/L (ref 0–0.2)
BASOPHILS RELATIVE PERCENT: 0.7 % (ref 0–2)
BILIRUB SERPL-MCNC: 0.3 MG/DL (ref 0–1.2)
BILIRUBIN DIRECT: <0.2 MG/DL (ref 0–0.3)
BILIRUBIN, INDIRECT: NORMAL MG/DL (ref 0–1)
CHOLESTEROL, TOTAL: 148 MG/DL (ref 0–199)
EOSINOPHILS ABSOLUTE: 0.06 E9/L (ref 0.05–0.5)
EOSINOPHILS RELATIVE PERCENT: 1 % (ref 0–6)
HCG QUALITATIVE: NEGATIVE
HCT VFR BLD CALC: 42.2 % (ref 34–48)
HDLC SERPL-MCNC: 39 MG/DL
HEMOGLOBIN: 13.5 G/DL (ref 11.5–15.5)
IMMATURE GRANULOCYTES #: 0.01 E9/L
IMMATURE GRANULOCYTES %: 0.2 % (ref 0–5)
LDL CHOLESTEROL CALCULATED: 82 MG/DL (ref 0–99)
LYMPHOCYTES ABSOLUTE: 2.12 E9/L (ref 1.5–4)
LYMPHOCYTES RELATIVE PERCENT: 37 % (ref 20–42)
MCH RBC QN AUTO: 28.1 PG (ref 26–35)
MCHC RBC AUTO-ENTMCNC: 32 % (ref 32–34.5)
MCV RBC AUTO: 87.7 FL (ref 80–99.9)
MONOCYTES ABSOLUTE: 0.42 E9/L (ref 0.1–0.95)
MONOCYTES RELATIVE PERCENT: 7.3 % (ref 2–12)
NEUTROPHILS ABSOLUTE: 3.08 E9/L (ref 1.8–7.3)
NEUTROPHILS RELATIVE PERCENT: 53.8 % (ref 43–80)
PDW BLD-RTO: 12.6 FL (ref 11.5–15)
PLATELET # BLD: 326 E9/L (ref 130–450)
PMV BLD AUTO: 9.5 FL (ref 7–12)
RBC # BLD: 4.81 E12/L (ref 3.5–5.5)
TOTAL PROTEIN: 7.9 G/DL (ref 6.4–8.3)
TRIGL SERPL-MCNC: 133 MG/DL (ref 0–149)
VLDLC SERPL CALC-MCNC: 27 MG/DL
WBC # BLD: 5.7 E9/L (ref 4.5–11.5)

## 2022-04-03 LAB
Lab: NORMAL
REPORT: NORMAL
THIS TEST SENT TO: NORMAL

## 2022-04-29 LAB
ALBUMIN SERPL-MCNC: 4.4 G/DL (ref 3.5–5.2)
ALP BLD-CCNC: 51 U/L (ref 35–104)
ALT SERPL-CCNC: 8 U/L (ref 0–32)
AST SERPL-CCNC: 15 U/L (ref 0–31)
BASOPHILS ABSOLUTE: 0.04 E9/L (ref 0–0.2)
BASOPHILS RELATIVE PERCENT: 0.7 % (ref 0–2)
BILIRUB SERPL-MCNC: 0.3 MG/DL (ref 0–1.2)
BILIRUBIN DIRECT: <0.2 MG/DL (ref 0–0.3)
BILIRUBIN, INDIRECT: NORMAL MG/DL (ref 0–1)
CHOLESTEROL, TOTAL: 173 MG/DL (ref 0–199)
EOSINOPHILS ABSOLUTE: 0.05 E9/L (ref 0.05–0.5)
EOSINOPHILS RELATIVE PERCENT: 0.8 % (ref 0–6)
HCG QUALITATIVE: NEGATIVE
HCT VFR BLD CALC: 40.9 % (ref 34–48)
HDLC SERPL-MCNC: 38 MG/DL
HEMOGLOBIN: 13.5 G/DL (ref 11.5–15.5)
IMMATURE GRANULOCYTES #: 0.02 E9/L
IMMATURE GRANULOCYTES %: 0.3 % (ref 0–5)
LDL CHOLESTEROL CALCULATED: 116 MG/DL (ref 0–99)
LYMPHOCYTES ABSOLUTE: 2.25 E9/L (ref 1.5–4)
LYMPHOCYTES RELATIVE PERCENT: 37.9 % (ref 20–42)
MCH RBC QN AUTO: 29 PG (ref 26–35)
MCHC RBC AUTO-ENTMCNC: 33 % (ref 32–34.5)
MCV RBC AUTO: 87.8 FL (ref 80–99.9)
MONOCYTES ABSOLUTE: 0.52 E9/L (ref 0.1–0.95)
MONOCYTES RELATIVE PERCENT: 8.8 % (ref 2–12)
NEUTROPHILS ABSOLUTE: 3.05 E9/L (ref 1.8–7.3)
NEUTROPHILS RELATIVE PERCENT: 51.5 % (ref 43–80)
PDW BLD-RTO: 12.8 FL (ref 11.5–15)
PLATELET # BLD: 341 E9/L (ref 130–450)
PMV BLD AUTO: 9.4 FL (ref 7–12)
RBC # BLD: 4.66 E12/L (ref 3.5–5.5)
TOTAL PROTEIN: 7.8 G/DL (ref 6.4–8.3)
TRIGL SERPL-MCNC: 95 MG/DL (ref 0–149)
VLDLC SERPL CALC-MCNC: 19 MG/DL
WBC # BLD: 5.9 E9/L (ref 4.5–11.5)

## 2022-05-05 LAB
Lab: NORMAL
REPORT: NORMAL
THIS TEST SENT TO: NORMAL

## 2022-06-01 LAB
ALBUMIN SERPL-MCNC: 4.3 G/DL (ref 3.5–5.2)
ALP BLD-CCNC: 55 U/L (ref 35–104)
ALT SERPL-CCNC: 23 U/L (ref 0–32)
AST SERPL-CCNC: 21 U/L (ref 0–31)
BASOPHILS ABSOLUTE: 0.05 E9/L (ref 0–0.2)
BASOPHILS RELATIVE PERCENT: 0.5 % (ref 0–2)
BILIRUB SERPL-MCNC: 0.2 MG/DL (ref 0–1.2)
BILIRUBIN DIRECT: <0.2 MG/DL (ref 0–0.3)
BILIRUBIN, INDIRECT: NORMAL MG/DL (ref 0–1)
CHOLESTEROL, TOTAL: 162 MG/DL (ref 0–199)
EOSINOPHILS ABSOLUTE: 0.09 E9/L (ref 0.05–0.5)
EOSINOPHILS RELATIVE PERCENT: 0.9 % (ref 0–6)
HCT VFR BLD CALC: 37.3 % (ref 34–48)
HDLC SERPL-MCNC: 39 MG/DL
HEMOGLOBIN: 12.2 G/DL (ref 11.5–15.5)
IMMATURE GRANULOCYTES #: 0.03 E9/L
IMMATURE GRANULOCYTES %: 0.3 % (ref 0–5)
LDL CHOLESTEROL CALCULATED: 95 MG/DL (ref 0–99)
LYMPHOCYTES ABSOLUTE: 2.97 E9/L (ref 1.5–4)
LYMPHOCYTES RELATIVE PERCENT: 30.7 % (ref 20–42)
MCH RBC QN AUTO: 28.4 PG (ref 26–35)
MCHC RBC AUTO-ENTMCNC: 32.7 % (ref 32–34.5)
MCV RBC AUTO: 86.9 FL (ref 80–99.9)
MONOCYTES ABSOLUTE: 0.75 E9/L (ref 0.1–0.95)
MONOCYTES RELATIVE PERCENT: 7.8 % (ref 2–12)
NEUTROPHILS ABSOLUTE: 5.78 E9/L (ref 1.8–7.3)
NEUTROPHILS RELATIVE PERCENT: 59.8 % (ref 43–80)
PDW BLD-RTO: 12.9 FL (ref 11.5–15)
PLATELET # BLD: 346 E9/L (ref 130–450)
PMV BLD AUTO: 9.3 FL (ref 7–12)
RBC # BLD: 4.29 E12/L (ref 3.5–5.5)
TOTAL PROTEIN: 7.5 G/DL (ref 6.4–8.3)
TRIGL SERPL-MCNC: 142 MG/DL (ref 0–149)
VLDLC SERPL CALC-MCNC: 28 MG/DL
WBC # BLD: 9.7 E9/L (ref 4.5–11.5)

## 2022-06-03 LAB — HCG TUMOR MARKER: <1 IU/L (ref 0–5)

## 2022-06-05 LAB
Lab: NORMAL
REPORT: NORMAL
THIS TEST SENT TO: NORMAL

## 2022-06-29 LAB
ALBUMIN SERPL-MCNC: 4.6 G/DL (ref 3.5–5.2)
ALP BLD-CCNC: 56 U/L (ref 35–104)
ALT SERPL-CCNC: 15 U/L (ref 0–32)
AST SERPL-CCNC: 17 U/L (ref 0–31)
BASOPHILS ABSOLUTE: 0.03 E9/L (ref 0–0.2)
BASOPHILS RELATIVE PERCENT: 0.5 % (ref 0–2)
BILIRUB SERPL-MCNC: 0.3 MG/DL (ref 0–1.2)
BILIRUBIN DIRECT: <0.2 MG/DL (ref 0–0.3)
BILIRUBIN, INDIRECT: NORMAL MG/DL (ref 0–1)
CHOLESTEROL, TOTAL: 200 MG/DL (ref 0–199)
EOSINOPHILS ABSOLUTE: 0.06 E9/L (ref 0.05–0.5)
EOSINOPHILS RELATIVE PERCENT: 1.1 % (ref 0–6)
HCT VFR BLD CALC: 42.1 % (ref 34–48)
HDLC SERPL-MCNC: 40 MG/DL
HEMOGLOBIN: 13.3 G/DL (ref 11.5–15.5)
IMMATURE GRANULOCYTES #: 0.01 E9/L
IMMATURE GRANULOCYTES %: 0.2 % (ref 0–5)
LDL CHOLESTEROL CALCULATED: 131 MG/DL (ref 0–99)
LYMPHOCYTES ABSOLUTE: 2.22 E9/L (ref 1.5–4)
LYMPHOCYTES RELATIVE PERCENT: 40.6 % (ref 20–42)
MCH RBC QN AUTO: 28.1 PG (ref 26–35)
MCHC RBC AUTO-ENTMCNC: 31.6 % (ref 32–34.5)
MCV RBC AUTO: 88.8 FL (ref 80–99.9)
MONOCYTES ABSOLUTE: 0.39 E9/L (ref 0.1–0.95)
MONOCYTES RELATIVE PERCENT: 7.1 % (ref 2–12)
NEUTROPHILS ABSOLUTE: 2.76 E9/L (ref 1.8–7.3)
NEUTROPHILS RELATIVE PERCENT: 50.5 % (ref 43–80)
PDW BLD-RTO: 13.1 FL (ref 11.5–15)
PLATELET # BLD: 360 E9/L (ref 130–450)
PMV BLD AUTO: 9.1 FL (ref 7–12)
RBC # BLD: 4.74 E12/L (ref 3.5–5.5)
TOTAL PROTEIN: 8.1 G/DL (ref 6.4–8.3)
TRIGL SERPL-MCNC: 143 MG/DL (ref 0–149)
VLDLC SERPL CALC-MCNC: 29 MG/DL
WBC # BLD: 5.5 E9/L (ref 4.5–11.5)

## 2022-07-01 LAB — GONADOTROPIN, CHORIONIC (HCG) QUANT: <0.1 MIU/ML

## 2022-08-03 LAB
ALBUMIN SERPL-MCNC: 4.5 G/DL (ref 3.5–5.2)
ALP BLD-CCNC: 56 U/L (ref 35–104)
ALT SERPL-CCNC: 17 U/L (ref 0–32)
AST SERPL-CCNC: 21 U/L (ref 0–31)
BASOPHILS ABSOLUTE: 0.03 E9/L (ref 0–0.2)
BASOPHILS RELATIVE PERCENT: 0.5 % (ref 0–2)
BILIRUB SERPL-MCNC: 0.2 MG/DL (ref 0–1.2)
BILIRUBIN DIRECT: <0.2 MG/DL (ref 0–0.3)
BILIRUBIN, INDIRECT: NORMAL MG/DL (ref 0–1)
CHOLESTEROL, TOTAL: 196 MG/DL (ref 0–199)
EOSINOPHILS ABSOLUTE: 0.06 E9/L (ref 0.05–0.5)
EOSINOPHILS RELATIVE PERCENT: 1 % (ref 0–6)
HCT VFR BLD CALC: 41.8 % (ref 34–48)
HDLC SERPL-MCNC: 44 MG/DL
HEMOGLOBIN: 13.8 G/DL (ref 11.5–15.5)
IMMATURE GRANULOCYTES #: 0.02 E9/L
IMMATURE GRANULOCYTES %: 0.3 % (ref 0–5)
LDL CHOLESTEROL CALCULATED: 130 MG/DL (ref 0–99)
LYMPHOCYTES ABSOLUTE: 2.6 E9/L (ref 1.5–4)
LYMPHOCYTES RELATIVE PERCENT: 44.1 % (ref 20–42)
MCH RBC QN AUTO: 28.3 PG (ref 26–35)
MCHC RBC AUTO-ENTMCNC: 33 % (ref 32–34.5)
MCV RBC AUTO: 85.8 FL (ref 80–99.9)
MONOCYTES ABSOLUTE: 0.58 E9/L (ref 0.1–0.95)
MONOCYTES RELATIVE PERCENT: 9.8 % (ref 2–12)
NEUTROPHILS ABSOLUTE: 2.6 E9/L (ref 1.8–7.3)
NEUTROPHILS RELATIVE PERCENT: 44.3 % (ref 43–80)
PDW BLD-RTO: 12.4 FL (ref 11.5–15)
PLATELET # BLD: 378 E9/L (ref 130–450)
PMV BLD AUTO: 9.2 FL (ref 7–12)
RBC # BLD: 4.87 E12/L (ref 3.5–5.5)
TOTAL PROTEIN: 8.2 G/DL (ref 6.4–8.3)
TRIGL SERPL-MCNC: 110 MG/DL (ref 0–149)
VLDLC SERPL CALC-MCNC: 22 MG/DL
WBC # BLD: 5.9 E9/L (ref 4.5–11.5)

## 2022-08-07 LAB — HCG TUMOR MARKER: <1 IU/L (ref 0–5)

## 2022-08-08 LAB
Lab: NORMAL
REPORT: NORMAL
THIS TEST SENT TO: NORMAL

## 2022-08-09 ENCOUNTER — OFFICE VISIT (OUTPATIENT)
Dept: PRIMARY CARE CLINIC | Age: 22
End: 2022-08-09
Payer: COMMERCIAL

## 2022-08-09 VITALS
DIASTOLIC BLOOD PRESSURE: 82 MMHG | WEIGHT: 171.2 LBS | TEMPERATURE: 96.9 F | SYSTOLIC BLOOD PRESSURE: 122 MMHG | RESPIRATION RATE: 18 BRPM | HEART RATE: 77 BPM | HEIGHT: 66 IN | BODY MASS INDEX: 27.51 KG/M2 | OXYGEN SATURATION: 99 %

## 2022-08-09 DIAGNOSIS — F41.1 GENERALIZED ANXIETY DISORDER: ICD-10-CM

## 2022-08-09 DIAGNOSIS — Z00.01 ENCOUNTER FOR WELL ADULT EXAM WITH ABNORMAL FINDINGS: Primary | ICD-10-CM

## 2022-08-09 DIAGNOSIS — G43.009 MIGRAINE WITHOUT AURA AND WITHOUT STATUS MIGRAINOSUS, NOT INTRACTABLE: ICD-10-CM

## 2022-08-09 DIAGNOSIS — F33.8 SEASONAL AFFECTIVE DISORDER (HCC): ICD-10-CM

## 2022-08-09 PROCEDURE — 99395 PREV VISIT EST AGE 18-39: CPT | Performed by: FAMILY MEDICINE

## 2022-08-09 RX ORDER — RIZATRIPTAN BENZOATE 10 MG/1
10 TABLET ORAL
Qty: 9 TABLET | Refills: 5 | Status: SHIPPED | OUTPATIENT
Start: 2022-08-09 | End: 2022-08-09

## 2022-08-09 RX ORDER — CITALOPRAM 40 MG/1
40 TABLET ORAL DAILY
Qty: 90 TABLET | Refills: 1 | Status: SHIPPED | OUTPATIENT
Start: 2022-08-09 | End: 2022-11-07

## 2022-08-09 RX ORDER — ISOTRETINOIN 40 MG/1
CAPSULE, LIQUID FILLED ORAL
COMMUNITY
Start: 2022-07-14

## 2022-08-09 RX ORDER — TOPIRAMATE 50 MG/1
50 TABLET, FILM COATED ORAL 2 TIMES DAILY
Qty: 180 TABLET | Refills: 1 | Status: SHIPPED | OUTPATIENT
Start: 2022-08-09

## 2022-08-09 ASSESSMENT — ENCOUNTER SYMPTOMS
PHOTOPHOBIA: 0
SHORTNESS OF BREATH: 0
EYE DISCHARGE: 0
ABDOMINAL DISTENTION: 0
VOMITING: 0
BACK PAIN: 0
DIARRHEA: 0
COUGH: 0
ABDOMINAL PAIN: 0
CONSTIPATION: 0
WHEEZING: 0
NAUSEA: 0

## 2022-08-09 NOTE — PROGRESS NOTES
22  Ernesto Shen : 2000 Sex: female  Age: 22 y.o. Chief Complaint   Patient presents with    Annual Exam     physical    Medication Refill     Pt is moving to [place]  for school and is unsure how to receive medications. Advised mom could fill them here and send them to her or when she finds a pharmacy near her in [place] she can let us know and we can send rx there. HPI:  22 y.o. female presents today for yearly physical.  Patient's chart, medical, surgical and medication history all reviewed. Well Adult Physical  Patient here for a physical exam.  The patient reports problems - Imitrex isn't helping anymore and causing increasing pain. Do you take any herbs or supplements that were not prescribed by a doctor? no   Are you taking calcium supplements? no   Are you taking aspirin daily? no    Colonoscopy: No prior colonoscopy  Dental visit: Within last 6 mos  Vision check:  No Problems    Last time routine bloodwork was done:  regularly through derm    Immunization status: missing doses of HPV. Smoking status: never    Physical activity:  infrequently    ROS:  Review of Systems   Constitutional:  Negative for chills, fatigue and fever. Eyes:  Negative for photophobia, discharge and visual disturbance. Respiratory:  Negative for cough, shortness of breath and wheezing. Cardiovascular:  Negative for chest pain and palpitations. Gastrointestinal:  Negative for abdominal distention, abdominal pain, constipation, diarrhea, nausea and vomiting. Musculoskeletal:  Negative for arthralgias and back pain. Skin:  Negative for rash. Neurological:  Positive for headaches. Negative for dizziness. Psychiatric/Behavioral:  Positive for sleep disturbance. Negative for dysphoric mood. The patient is nervous/anxious. All other systems reviewed and are negative.    Current Outpatient Medications on File Prior to Visit   Medication Sig Dispense Refill    CLARAVIS 40 MG chemo capsule Exam:  Physical Exam  Vitals and nursing note reviewed. Constitutional:       General: She is not in acute distress. Appearance: Normal appearance. She is well-developed and normal weight. She is not ill-appearing. HENT:      Head: Normocephalic and atraumatic. Right Ear: External ear normal.      Left Ear: External ear normal.      Nose:      Comments: Wearing mask  Eyes:      General: No scleral icterus. Extraocular Movements: Extraocular movements intact. Conjunctiva/sclera: Conjunctivae normal.   Neck:      Thyroid: No thyromegaly. Cardiovascular:      Rate and Rhythm: Normal rate and regular rhythm. Heart sounds: Normal heart sounds. No murmur heard. Pulmonary:      Effort: Pulmonary effort is normal. No respiratory distress. Breath sounds: Normal breath sounds. No wheezing. Musculoskeletal:         General: No tenderness or deformity. Normal range of motion. Cervical back: Normal range of motion and neck supple. Right lower leg: No edema. Left lower leg: No edema. Lymphadenopathy:      Cervical: No cervical adenopathy. Skin:     General: Skin is warm and dry. Findings: No rash. Neurological:      General: No focal deficit present. Mental Status: She is alert and oriented to person, place, and time. Gait: Gait normal.   Psychiatric:         Mood and Affect: Mood normal.         Speech: Speech normal.         Behavior: Behavior normal.         Thought Content:  Thought content normal.       Labs:  CBC with Differential:    Lab Results   Component Value Date/Time    WBC 5.9 08/03/2022 04:42 PM    RBC 4.87 08/03/2022 04:42 PM    HGB 13.8 08/03/2022 04:42 PM    HCT 41.8 08/03/2022 04:42 PM     08/03/2022 04:42 PM    MCV 85.8 08/03/2022 04:42 PM    MCH 28.3 08/03/2022 04:42 PM    MCHC 33.0 08/03/2022 04:42 PM    RDW 12.4 08/03/2022 04:42 PM    LYMPHOPCT 44.1 08/03/2022 04:42 PM    MONOPCT 9.8 08/03/2022 04:42 PM    BASOPCT 0.5 08/03/2022 04:42 PM    MONOSABS 0.58 08/03/2022 04:42 PM    LYMPHSABS 2.60 08/03/2022 04:42 PM    EOSABS 0.06 08/03/2022 04:42 PM    BASOSABS 0.03 08/03/2022 04:42 PM     CMP:    Lab Results   Component Value Date/Time     02/04/2021 09:28 AM    K 4.1 02/04/2021 09:28 AM     02/04/2021 09:28 AM    CO2 23 02/04/2021 09:28 AM    BUN 12 02/04/2021 09:28 AM    CREATININE 0.8 02/04/2021 09:28 AM    GFRAA >60 02/04/2021 09:28 AM    LABGLOM >60 02/04/2021 09:28 AM    GLUCOSE 116 06/29/2021 10:15 AM    PROT 8.2 08/03/2022 04:42 PM    LABALBU 4.5 08/03/2022 04:42 PM    CALCIUM 9.7 02/04/2021 09:28 AM    BILITOT 0.2 08/03/2022 04:42 PM    ALKPHOS 56 08/03/2022 04:42 PM    AST 21 08/03/2022 04:42 PM    ALT 17 08/03/2022 04:42 PM     U/A:  No results found for: NITRITE, COLORU, PROTEINU, PHUR, LABCAST, WBCUA, RBCUA, MUCUS, TRICHOMONAS, YEAST, BACTERIA, CLARITYU, SPECGRAV, LEUKOCYTESUR, UROBILINOGEN, BILIRUBINUR, BLOODU, GLUCOSEU, AMORPHOUS  HgBA1c:    Lab Results   Component Value Date/Time    LABA1C 4.9 10/28/2019 04:13 PM     FLP:    Lab Results   Component Value Date/Time    TRIG 110 08/03/2022 04:42 PM    HDL 44 08/03/2022 04:42 PM    LDLCALC 130 08/03/2022 04:42 PM    LABVLDL 22 08/03/2022 04:42 PM     TSH:    Lab Results   Component Value Date/Time    TSH 1.080 11/10/2021 10:17 AM        Assessment and Plan:  Virginia Arnett was seen today for annual exam and medication refill. Diagnoses and all orders for this visit:    Encounter for well adult exam with abnormal findings  Healthy 23 yo female. UTD on HM. Generalized anxiety disorder  -     citalopram (CELEXA) 40 MG tablet; Take 1 tablet by mouth in the morning. Well controlled    Seasonal affective disorder ([address]  -     citalopram (CELEXA) 40 MG tablet; Take 1 tablet by mouth in the morning. Migraine without aura and without status migrainosus, not intractable  -     topiramate (TOPAMAX) 50 MG tablet;  Take 1 tablet by mouth in the morning and 1 tablet before bedtime. -     rizatriptan (MAXALT) 10 MG tablet; Take 1 tablet by mouth once as needed for Migraine May repeat in 2 hours if needed  Discussed trying Maxalt instead of Imitrex at this time    Return in about 6 months (around 2/9/2023), or if symptoms worsen or fail to improve, for Chronic medical conditions.       Seen By:  Rikki García, DO

## 2022-08-10 LAB — HCG TUMOR MARKER: NORMAL

## 2022-08-12 ENCOUNTER — TELEPHONE (OUTPATIENT)
Dept: PRIMARY CARE CLINIC | Age: 22
End: 2022-08-12

## 2022-08-15 LAB — HCG QUALITATIVE: NEGATIVE

## 2022-09-23 LAB
ALBUMIN SERPL-MCNC: 4.5 G/DL (ref 3.5–5.2)
ALP BLD-CCNC: 62 U/L (ref 35–104)
ALT SERPL-CCNC: 17 U/L (ref 0–32)
AST SERPL-CCNC: 21 U/L (ref 0–31)
BILIRUB SERPL-MCNC: 0.3 MG/DL (ref 0–1.2)
BILIRUBIN DIRECT: <0.2 MG/DL (ref 0–0.3)
BILIRUBIN, INDIRECT: NORMAL MG/DL (ref 0–1)
CHOLESTEROL, TOTAL: 156 MG/DL (ref 0–199)
HCG QUALITATIVE: NEGATIVE
TOTAL PROTEIN: 7.7 G/DL (ref 6.4–8.3)
TRIGL SERPL-MCNC: 122 MG/DL (ref 0–149)

## 2023-02-14 ENCOUNTER — OFFICE VISIT (OUTPATIENT)
Dept: PRIMARY CARE CLINIC | Age: 23
End: 2023-02-14
Payer: COMMERCIAL

## 2023-02-14 VITALS
TEMPERATURE: 97.8 F | HEIGHT: 66 IN | HEART RATE: 88 BPM | OXYGEN SATURATION: 99 % | WEIGHT: 167.5 LBS | DIASTOLIC BLOOD PRESSURE: 78 MMHG | BODY MASS INDEX: 26.92 KG/M2 | SYSTOLIC BLOOD PRESSURE: 108 MMHG

## 2023-02-14 DIAGNOSIS — F33.8 SEASONAL AFFECTIVE DISORDER (HCC): ICD-10-CM

## 2023-02-14 DIAGNOSIS — F41.1 GENERALIZED ANXIETY DISORDER: Primary | ICD-10-CM

## 2023-02-14 DIAGNOSIS — G43.009 MIGRAINE WITHOUT AURA AND WITHOUT STATUS MIGRAINOSUS, NOT INTRACTABLE: ICD-10-CM

## 2023-02-14 DIAGNOSIS — Z30.41 ENCOUNTER FOR SURVEILLANCE OF CONTRACEPTIVE PILLS: ICD-10-CM

## 2023-02-14 PROCEDURE — G8484 FLU IMMUNIZE NO ADMIN: HCPCS | Performed by: FAMILY MEDICINE

## 2023-02-14 PROCEDURE — 1036F TOBACCO NON-USER: CPT | Performed by: FAMILY MEDICINE

## 2023-02-14 PROCEDURE — 99214 OFFICE O/P EST MOD 30 MIN: CPT | Performed by: FAMILY MEDICINE

## 2023-02-14 PROCEDURE — G8419 CALC BMI OUT NRM PARAM NOF/U: HCPCS | Performed by: FAMILY MEDICINE

## 2023-02-14 PROCEDURE — G8427 DOCREV CUR MEDS BY ELIG CLIN: HCPCS | Performed by: FAMILY MEDICINE

## 2023-02-14 RX ORDER — CITALOPRAM 40 MG/1
40 TABLET ORAL DAILY
Qty: 90 TABLET | Refills: 1 | Status: SHIPPED | OUTPATIENT
Start: 2023-02-14 | End: 2023-05-15

## 2023-02-14 RX ORDER — TOPIRAMATE 50 MG/1
50 TABLET, FILM COATED ORAL 2 TIMES DAILY
Qty: 180 TABLET | Refills: 1 | Status: SHIPPED | OUTPATIENT
Start: 2023-02-14

## 2023-02-14 RX ORDER — NORETHINDRONE ACETATE AND ETHINYL ESTRADIOL 1.5-30(21)
1 KIT ORAL DAILY
Qty: 3 PACKET | Refills: 3 | Status: SHIPPED | OUTPATIENT
Start: 2023-02-14

## 2023-02-14 ASSESSMENT — ENCOUNTER SYMPTOMS
CONSTIPATION: 0
PHOTOPHOBIA: 0
DIARRHEA: 0
WHEEZING: 0
VOMITING: 0
SHORTNESS OF BREATH: 0
BACK PAIN: 0
NAUSEA: 0
EYE DISCHARGE: 0
ABDOMINAL DISTENTION: 0
ABDOMINAL PAIN: 0
COUGH: 0

## 2023-02-14 ASSESSMENT — PATIENT HEALTH QUESTIONNAIRE - PHQ9
9. THOUGHTS THAT YOU WOULD BE BETTER OFF DEAD, OR OF HURTING YOURSELF: 0
6. FEELING BAD ABOUT YOURSELF - OR THAT YOU ARE A FAILURE OR HAVE LET YOURSELF OR YOUR FAMILY DOWN: 0
SUM OF ALL RESPONSES TO PHQ QUESTIONS 1-9: 0
SUM OF ALL RESPONSES TO PHQ9 QUESTIONS 1 & 2: 0
1. LITTLE INTEREST OR PLEASURE IN DOING THINGS: 0
2. FEELING DOWN, DEPRESSED OR HOPELESS: 0
SUM OF ALL RESPONSES TO PHQ QUESTIONS 1-9: 0
8. MOVING OR SPEAKING SO SLOWLY THAT OTHER PEOPLE COULD HAVE NOTICED. OR THE OPPOSITE, BEING SO FIGETY OR RESTLESS THAT YOU HAVE BEEN MOVING AROUND A LOT MORE THAN USUAL: 0
SUM OF ALL RESPONSES TO PHQ QUESTIONS 1-9: 0
4. FEELING TIRED OR HAVING LITTLE ENERGY: 0
3. TROUBLE FALLING OR STAYING ASLEEP: 0
7. TROUBLE CONCENTRATING ON THINGS, SUCH AS READING THE NEWSPAPER OR WATCHING TELEVISION: 0
10. IF YOU CHECKED OFF ANY PROBLEMS, HOW DIFFICULT HAVE THESE PROBLEMS MADE IT FOR YOU TO DO YOUR WORK, TAKE CARE OF THINGS AT HOME, OR GET ALONG WITH OTHER PEOPLE: 0
5. POOR APPETITE OR OVEREATING: 0
SUM OF ALL RESPONSES TO PHQ QUESTIONS 1-9: 0

## 2023-02-14 NOTE — PROGRESS NOTES
23  Marybeth Menjivar : 2000 Sex: female  Age: 25 y.o. Chief Complaint   Patient presents with    Anxiety     HPI:  25 y.o. female presents today for 6 month follow up of chronic medical conditions, medication refills. Patient's chart, medical, surgical and medication history all reviewed. Migraine  Patient complains of migraine headaches. She does not have a headache at this time. Description of Headaches:  Location of pain: left-sided unilateral  Radiation of pain?:none  Character of pain:aching and throbbing  Accompanying symptoms: nausea, vomiting, sonophobia, photophobia  Prodromal sx?: pain behind eyes  Typical precipitants: unknown    Temporal Pattern of Headaches:  Started having HAs 2 year ago  Worst time of day: any time  Awaken from sleep?: no  Seasonal pattern?: no  Clustering of HAs over time? no  Change with menstrual cycle?: no  Overall pattern since problem began: stable    Degree of Functional Impairment: moderate    Current Use of Meds to Treat HA:  Abortive meds? Maxalt  Daily use? no  Prophylactic meds? Topamax    Previous Meds Tried for Treatment of HA:  Beta blockers:  Metoprolol (caused fatigue)  Calcium channel blockers:  none  Anti-convulsants:  topamax  Triptans/Others:  Ibuprofen and Sumatriptan  oral (Imitrex ), Maxalt  Botox injections: no      Additional Relevant History:  History of head/neck trauma? no  History of head/neck surgery? no  Family h/o headache problems? no  Use of meds that might worsen HAs? no  Exposure to carbon monoxide? no  Substance use: alcohol: no, illicit drugs: no, tobacco: no, caffeine: no    Anxiety/Depression  Patient complains of evaluation of anxiety disorder and sleep disturbance. She has the following anxiety symptoms: difficulty concentrating, fatigue, feelings of losing control, racing thoughts, hypersomnia . Onset of symptoms was approximately several years ago, improved since that time.  She denies current suicidal and homicidal ideation. Previous treatment includes Celexa, Lexapro and Wellbutrin and  no therapy . She complains of the following side effects from the treatment: none. Patient noting improvement in moods overall with increase in Celexa. Feels better now than when she was taking Wellbutrin. ROS:  Review of Systems   Constitutional:  Negative for chills, fatigue and fever. Eyes:  Negative for photophobia, discharge and visual disturbance. Respiratory:  Negative for cough, shortness of breath and wheezing. Cardiovascular:  Negative for chest pain and palpitations. Gastrointestinal:  Negative for abdominal distention, abdominal pain, constipation, diarrhea, nausea and vomiting. Musculoskeletal:  Negative for arthralgias and back pain. Skin:  Negative for rash. Neurological:  Positive for headaches. Negative for dizziness. Psychiatric/Behavioral:  Positive for sleep disturbance. Negative for dysphoric mood. The patient is nervous/anxious. All other systems reviewed and are negative. Current Outpatient Medications:     citalopram (CELEXA) 40 MG tablet, Take 1 tablet by mouth daily, Disp: 90 tablet, Rfl: 1    topiramate (TOPAMAX) 50 MG tablet, Take 1 tablet by mouth 2 times daily, Disp: 180 tablet, Rfl: 1    norethindrone-ethinyl estradiol-iron (LOESTRIN FE 1.5/30) 1.5-30 MG-MCG tablet, Take 1 tablet by mouth daily, Disp: 3 packet, Rfl: 3    rizatriptan (MAXALT) 10 MG tablet, Take 1 tablet by mouth once as needed for Migraine May repeat in 2 hours if needed, Disp: 9 tablet, Rfl: 5    albuterol sulfate HFA (PROVENTIL;VENTOLIN;PROAIR) 108 (90 Base) MCG/ACT inhaler, Inhale 2 puffs into the lungs every 6 hours as needed for Wheezing, Disp: , Rfl:     Cholecalciferol (VITAMIN D3) 125 MCG (5000 UT) TABS, Take 5,000 Units by mouth daily, Disp: , Rfl:     No Known Allergies    History reviewed. No pertinent past medical history. History reviewed. No pertinent surgical history. History reviewed. No pertinent family history. Social History     Socioeconomic History    Marital status: Single     Spouse name: Not on file    Number of children: Not on file    Years of education: Not on file    Highest education level: Not on file   Occupational History    Not on file   Tobacco Use    Smoking status: Never    Smokeless tobacco: Never   Vaping Use    Vaping Use: Never used   Substance and Sexual Activity    Alcohol use: Not on file    Drug use: Not on file    Sexual activity: Not on file   Other Topics Concern    Not on file   Social History Narrative    Not on file     Social Determinants of Health     Financial Resource Strain: Not on file   Food Insecurity: Not on file   Transportation Needs: Not on file   Physical Activity: Not on file   Stress: Not on file   Social Connections: Not on file   Intimate Partner Violence: Not on file   Housing Stability: Not on file       Vitals:    02/14/23 0937   BP: 108/78   Pulse: 88   Temp: 97.8 °F (36.6 °C)   TempSrc: Temporal   SpO2: 99%   Weight: 167 lb 8 oz (76 kg)   Height: 5' 6\" (1.676 m)       Physical Exam:  Physical Exam  Vitals and nursing note reviewed. Constitutional:       General: She is not in acute distress. Appearance: Normal appearance. She is well-developed and normal weight. She is not ill-appearing. HENT:      Head: Normocephalic and atraumatic. Right Ear: External ear normal.      Left Ear: External ear normal.      Nose: Nose normal.      Mouth/Throat:      Mouth: Mucous membranes are moist.   Eyes:      General: No scleral icterus. Extraocular Movements: Extraocular movements intact. Conjunctiva/sclera: Conjunctivae normal.   Neck:      Thyroid: No thyromegaly. Cardiovascular:      Rate and Rhythm: Normal rate and regular rhythm. Heart sounds: Normal heart sounds. No murmur heard. Pulmonary:      Effort: Pulmonary effort is normal. No respiratory distress. Breath sounds: Normal breath sounds. No wheezing. Musculoskeletal:         General: No tenderness or deformity. Normal range of motion. Cervical back: Normal range of motion and neck supple. Right lower leg: No edema. Left lower leg: No edema. Lymphadenopathy:      Cervical: No cervical adenopathy. Skin:     General: Skin is warm and dry. Findings: No rash. Neurological:      General: No focal deficit present. Mental Status: She is alert and oriented to person, place, and time. Gait: Gait normal.   Psychiatric:         Mood and Affect: Mood normal.         Speech: Speech normal.         Behavior: Behavior normal.         Thought Content:  Thought content normal.     Labs  CBC with Differential:    Lab Results   Component Value Date/Time    WBC 5.9 08/03/2022 04:42 PM    RBC 4.87 08/03/2022 04:42 PM    HGB 13.8 08/03/2022 04:42 PM    HCT 41.8 08/03/2022 04:42 PM     08/03/2022 04:42 PM    MCV 85.8 08/03/2022 04:42 PM    MCH 28.3 08/03/2022 04:42 PM    MCHC 33.0 08/03/2022 04:42 PM    RDW 12.4 08/03/2022 04:42 PM    LYMPHOPCT 44.1 08/03/2022 04:42 PM    MONOPCT 9.8 08/03/2022 04:42 PM    BASOPCT 0.5 08/03/2022 04:42 PM    MONOSABS 0.58 08/03/2022 04:42 PM    LYMPHSABS 2.60 08/03/2022 04:42 PM    EOSABS 0.06 08/03/2022 04:42 PM    BASOSABS 0.03 08/03/2022 04:42 PM     CMP:    Lab Results   Component Value Date/Time     02/04/2021 09:28 AM    K 4.1 02/04/2021 09:28 AM     02/04/2021 09:28 AM    CO2 23 02/04/2021 09:28 AM    BUN 12 02/04/2021 09:28 AM    CREATININE 0.8 02/04/2021 09:28 AM    GFRAA >60 02/04/2021 09:28 AM    LABGLOM >60 02/04/2021 09:28 AM    GLUCOSE 116 06/29/2021 10:15 AM    PROT 7.7 09/23/2022 02:56 PM    LABALBU 4.5 09/23/2022 02:56 PM    CALCIUM 9.7 02/04/2021 09:28 AM    BILITOT 0.3 09/23/2022 02:56 PM    ALKPHOS 62 09/23/2022 02:56 PM    AST 21 09/23/2022 02:56 PM    ALT 17 09/23/2022 02:56 PM     U/A:  No results found for: NITRITE, COLORU, PROTEINU, PHUR, LABCAST, [address], RBCUA, MUCUS, TRICHOMONAS, YEAST, BACTERIA, CLARITYU, SPECGRAV, LEUKOCYTESUR, UROBILINOGEN, BILIRUBINUR, BLOODU, GLUCOSEU, AMORPHOUS  HgBA1c:    Lab Results   Component Value Date/Time    LABA1C 4.9 10/28/2019 04:13 PM     FLP:    Lab Results   Component Value Date/Time    TRIG 122 09/23/2022 02:56 PM    HDL 44 08/03/2022 04:42 PM    LDLCALC 130 08/03/2022 04:42 PM    LABVLDL 22 08/03/2022 04:42 PM     TSH:    Lab Results   Component Value Date/Time    TSH 1.080 11/10/2021 10:17 AM        Assessment and Plan:  Omi Eldridge was seen today for anxiety. Diagnoses and all orders for this visit:    Generalized anxiety disorder  -     citalopram (CELEXA) 40 MG tablet; Take 1 tablet by mouth daily  Well controlled per patient. Will send refill on current dosing at this time. Seasonal affective disorder ([facility] [address]  -     citalopram (CELEXA) 40 MG tablet; Take 1 tablet by mouth daily    Migraine without aura and without status migrainosus, not intractable  -     topiramate (TOPAMAX) 50 MG tablet; Take 1 tablet by mouth 2 times daily  Has not needed abortive treatment for a while. Encounter for surveillance of contraceptive pills  -     norethindrone-ethinyl estradiol-iron (LOESTRIN FE 1.5/30) 1.5-30 MG-MCG tablet; Take 1 tablet by mouth daily  Still having intermittent breakthrough bleeding. Will increase dose for better control. Return in about 6 months (around 8/14/2023), or if symptoms worsen or fail to improve, for Well visit.       Seen By:  Rikki García DO

## 2023-07-23 DIAGNOSIS — G43.009 MIGRAINE WITHOUT AURA AND WITHOUT STATUS MIGRAINOSUS, NOT INTRACTABLE: ICD-10-CM

## 2023-07-23 DIAGNOSIS — F33.8 SEASONAL AFFECTIVE DISORDER (HCC): ICD-10-CM

## 2023-07-23 DIAGNOSIS — F41.1 GENERALIZED ANXIETY DISORDER: ICD-10-CM

## 2023-07-24 RX ORDER — CITALOPRAM 40 MG/1
40 TABLET ORAL DAILY
Qty: 90 TABLET | Refills: 1 | Status: SHIPPED | OUTPATIENT
Start: 2023-07-24 | End: 2023-10-22

## 2023-07-24 RX ORDER — TOPIRAMATE 50 MG/1
50 TABLET, FILM COATED ORAL 2 TIMES DAILY
Qty: 180 TABLET | Refills: 1 | Status: SHIPPED | OUTPATIENT
Start: 2023-07-24

## 2023-09-07 ENCOUNTER — OFFICE VISIT (OUTPATIENT)
Dept: PRIMARY CARE CLINIC | Age: 23
End: 2023-09-07
Payer: COMMERCIAL

## 2023-09-07 VITALS
TEMPERATURE: 97.3 F | BODY MASS INDEX: 28.12 KG/M2 | HEART RATE: 75 BPM | SYSTOLIC BLOOD PRESSURE: 92 MMHG | WEIGHT: 175 LBS | HEIGHT: 66 IN | OXYGEN SATURATION: 99 % | DIASTOLIC BLOOD PRESSURE: 68 MMHG

## 2023-09-07 DIAGNOSIS — Z00.00 ENCOUNTER FOR WELL ADULT EXAM WITHOUT ABNORMAL FINDINGS: Primary | ICD-10-CM

## 2023-09-07 DIAGNOSIS — G43.009 MIGRAINE WITHOUT AURA AND WITHOUT STATUS MIGRAINOSUS, NOT INTRACTABLE: ICD-10-CM

## 2023-09-07 PROCEDURE — 99395 PREV VISIT EST AGE 18-39: CPT | Performed by: FAMILY MEDICINE

## 2023-09-07 RX ORDER — BACLOFEN 10 MG/1
10 TABLET ORAL 3 TIMES DAILY
Qty: 90 TABLET | Refills: 0 | Status: SHIPPED | OUTPATIENT
Start: 2023-09-07

## 2023-09-07 SDOH — ECONOMIC STABILITY: HOUSING INSECURITY
IN THE LAST 12 MONTHS, WAS THERE A TIME WHEN YOU DID NOT HAVE A STEADY PLACE TO SLEEP OR SLEPT IN A SHELTER (INCLUDING NOW)?: NO

## 2023-09-07 SDOH — ECONOMIC STABILITY: INCOME INSECURITY: HOW HARD IS IT FOR YOU TO PAY FOR THE VERY BASICS LIKE FOOD, HOUSING, MEDICAL CARE, AND HEATING?: NOT HARD AT ALL

## 2023-09-07 SDOH — ECONOMIC STABILITY: FOOD INSECURITY: WITHIN THE PAST 12 MONTHS, YOU WORRIED THAT YOUR FOOD WOULD RUN OUT BEFORE YOU GOT MONEY TO BUY MORE.: NEVER TRUE

## 2023-09-07 SDOH — ECONOMIC STABILITY: FOOD INSECURITY: WITHIN THE PAST 12 MONTHS, THE FOOD YOU BOUGHT JUST DIDN'T LAST AND YOU DIDN'T HAVE MONEY TO GET MORE.: NEVER TRUE

## 2023-09-07 ASSESSMENT — ENCOUNTER SYMPTOMS
VOMITING: 0
NAUSEA: 0
CONSTIPATION: 0
WHEEZING: 0
BACK PAIN: 0
PHOTOPHOBIA: 0
SHORTNESS OF BREATH: 0
COUGH: 0
DIARRHEA: 0
EYE DISCHARGE: 0
ABDOMINAL PAIN: 0
ABDOMINAL DISTENTION: 0

## 2023-09-07 NOTE — PROGRESS NOTES
23  [facility] : 2000 Sex: female  Age: 25 y.o. Chief Complaint   Patient presents with    Annual Exam     HPI:  25 y.o. female presents today for yearly physical.  Patient's chart, medical, surgical and medication history all reviewed. Well Adult Physical  Patient here for a physical exam.  The patient reports problems - migraines changing. Overall less random with Topamax, but now getting triggered by visual disturbances and feeling tightness around eyes. Do you take any herbs or supplements that were not prescribed by a doctor? no   Are you taking calcium supplements? no   Are you taking aspirin daily? no    Colonoscopy: No prior colonoscopy  Dental visit: Within last 6 mos  Vision check:  No Problems  PAP:  Not yet. Never sexually active. Last time routine bloodwork was done:  2022    Immunization status: missing doses of HPV. Smoking status: never    Physical activity:  infrequently    ROS:  Review of Systems   Constitutional:  Negative for chills, fatigue and fever. Eyes:  Negative for photophobia, discharge and visual disturbance. Respiratory:  Negative for cough, shortness of breath and wheezing. Cardiovascular:  Negative for chest pain and palpitations. Gastrointestinal:  Negative for abdominal distention, abdominal pain, constipation, diarrhea, nausea and vomiting. Musculoskeletal:  Negative for arthralgias and back pain. Skin:  Negative for rash. Neurological:  Positive for headaches. Negative for dizziness. Psychiatric/Behavioral:  Positive for sleep disturbance. Negative for dysphoric mood. The patient is nervous/anxious. All other systems reviewed and are negative.    Current Outpatient Medications on File Prior to Visit   Medication Sig Dispense Refill    citalopram (CELEXA) 40 MG tablet Take 1 tablet by mouth daily 90 tablet 1    topiramate (TOPAMAX) 50 MG tablet Take 1 tablet by mouth 2 times daily 180 tablet 1    norethindrone-ethinyl

## 2023-10-09 ENCOUNTER — OFFICE VISIT (OUTPATIENT)
Dept: PRIMARY CARE CLINIC | Age: 23
End: 2023-10-09
Payer: COMMERCIAL

## 2023-10-09 ENCOUNTER — TELEPHONE (OUTPATIENT)
Dept: PRIMARY CARE CLINIC | Age: 23
End: 2023-10-09

## 2023-10-09 VITALS
BODY MASS INDEX: 29.09 KG/M2 | DIASTOLIC BLOOD PRESSURE: 70 MMHG | SYSTOLIC BLOOD PRESSURE: 112 MMHG | HEART RATE: 67 BPM | OXYGEN SATURATION: 100 % | WEIGHT: 181 LBS | TEMPERATURE: 97.6 F | HEIGHT: 66 IN

## 2023-10-09 DIAGNOSIS — R22.2 MASS OF BUTTOCK: Primary | ICD-10-CM

## 2023-10-09 PROCEDURE — G8484 FLU IMMUNIZE NO ADMIN: HCPCS | Performed by: FAMILY MEDICINE

## 2023-10-09 PROCEDURE — G8427 DOCREV CUR MEDS BY ELIG CLIN: HCPCS | Performed by: FAMILY MEDICINE

## 2023-10-09 PROCEDURE — 99213 OFFICE O/P EST LOW 20 MIN: CPT | Performed by: FAMILY MEDICINE

## 2023-10-09 PROCEDURE — 1036F TOBACCO NON-USER: CPT | Performed by: FAMILY MEDICINE

## 2023-10-09 PROCEDURE — G8419 CALC BMI OUT NRM PARAM NOF/U: HCPCS | Performed by: FAMILY MEDICINE

## 2023-10-09 ASSESSMENT — ENCOUNTER SYMPTOMS
COUGH: 0
BACK PAIN: 0
NAUSEA: 0
ABDOMINAL PAIN: 0
WHEEZING: 0
SHORTNESS OF BREATH: 0
CONSTIPATION: 0
VOMITING: 0
DIARRHEA: 0

## 2023-10-09 NOTE — TELEPHONE ENCOUNTER
Mom asking if patient would be able to come in sometime this afternoon after 230pm. Patient fell a couple of weeks ago and patient is still having bruising on her buttocks.  If not able to come in today on your schedule, mom said patient would try to come in through [facility]

## 2023-10-09 NOTE — PROGRESS NOTES
dry.      Findings: No rash. Neurological:      General: No focal deficit present. Mental Status: She is alert and oriented to person, place, and time. Gait: Gait normal.   Psychiatric:         Mood and Affect: Mood and affect normal.         Speech: Speech normal.         Behavior: Behavior normal.         Thought Content: Thought content normal.         No results found for this visit on 10/09/23. Assessment and Plan:  French was seen today for fall. Diagnoses and all orders for this visit:    Mass of buttock  -     US SOFT TISSUE LIMITED AREA; Future    Suspect resolving hematoma. Discussed heating pad. US to rule out mass. Return if symptoms worsen or fail to improve.       Seen By:  Rosa Winn, DO

## 2024-03-06 DIAGNOSIS — Z30.41 ENCOUNTER FOR SURVEILLANCE OF CONTRACEPTIVE PILLS: ICD-10-CM

## 2024-03-06 RX ORDER — NORETHINDRONE ACETATE AND ETHINYL ESTRADIOL AND FERROUS FUMARATE 1.5-30(21)
1 KIT ORAL DAILY
Qty: 84 TABLET | Refills: 3 | Status: SHIPPED | OUTPATIENT
Start: 2024-03-06

## 2024-08-07 DIAGNOSIS — G43.009 MIGRAINE WITHOUT AURA AND WITHOUT STATUS MIGRAINOSUS, NOT INTRACTABLE: ICD-10-CM

## 2024-08-07 DIAGNOSIS — F33.8 SEASONAL AFFECTIVE DISORDER (HCC): ICD-10-CM

## 2024-08-07 DIAGNOSIS — F41.1 GENERALIZED ANXIETY DISORDER: ICD-10-CM

## 2024-08-08 RX ORDER — BACLOFEN 10 MG/1
10 TABLET ORAL 3 TIMES DAILY
Qty: 90 TABLET | Refills: 0 | Status: SHIPPED | OUTPATIENT
Start: 2024-08-08

## 2024-08-08 RX ORDER — CITALOPRAM 40 MG/1
40 TABLET ORAL DAILY
Qty: 90 TABLET | Refills: 1 | Status: SHIPPED | OUTPATIENT
Start: 2024-08-08 | End: 2024-11-06

## 2025-03-24 ENCOUNTER — OFFICE VISIT (OUTPATIENT)
Dept: PRIMARY CARE CLINIC | Age: 25
End: 2025-03-24
Payer: COMMERCIAL

## 2025-03-24 VITALS
HEIGHT: 66 IN | HEART RATE: 86 BPM | BODY MASS INDEX: 30.05 KG/M2 | OXYGEN SATURATION: 99 % | WEIGHT: 187 LBS | TEMPERATURE: 98.3 F | DIASTOLIC BLOOD PRESSURE: 64 MMHG | SYSTOLIC BLOOD PRESSURE: 112 MMHG

## 2025-03-24 DIAGNOSIS — R22.2 MASS OF BUTTOCK: ICD-10-CM

## 2025-03-24 DIAGNOSIS — G43.009 MIGRAINE WITHOUT AURA AND WITHOUT STATUS MIGRAINOSUS, NOT INTRACTABLE: ICD-10-CM

## 2025-03-24 DIAGNOSIS — F41.1 GENERALIZED ANXIETY DISORDER: ICD-10-CM

## 2025-03-24 DIAGNOSIS — E55.9 VITAMIN D INSUFFICIENCY: ICD-10-CM

## 2025-03-24 DIAGNOSIS — Z13.6 SCREENING FOR CARDIOVASCULAR CONDITION: ICD-10-CM

## 2025-03-24 DIAGNOSIS — F33.8 SEASONAL AFFECTIVE DISORDER: ICD-10-CM

## 2025-03-24 DIAGNOSIS — R79.89 ELEVATED CORTISOL LEVEL: ICD-10-CM

## 2025-03-24 DIAGNOSIS — Z00.01 ENCOUNTER FOR WELL ADULT EXAM WITH ABNORMAL FINDINGS: Primary | ICD-10-CM

## 2025-03-24 DIAGNOSIS — N93.9 ABNORMAL UTERINE BLEEDING (AUB): ICD-10-CM

## 2025-03-24 PROCEDURE — 99395 PREV VISIT EST AGE 18-39: CPT | Performed by: FAMILY MEDICINE

## 2025-03-24 PROCEDURE — 99213 OFFICE O/P EST LOW 20 MIN: CPT | Performed by: FAMILY MEDICINE

## 2025-03-24 RX ORDER — RIZATRIPTAN BENZOATE 10 MG/1
10 TABLET ORAL
Qty: 9 TABLET | Refills: 11 | Status: SHIPPED | OUTPATIENT
Start: 2025-03-24 | End: 2027-11-07

## 2025-03-24 RX ORDER — TOPIRAMATE 50 MG/1
50 TABLET, FILM COATED ORAL 2 TIMES DAILY
Qty: 180 TABLET | Refills: 3 | Status: SHIPPED | OUTPATIENT
Start: 2025-03-24

## 2025-03-24 RX ORDER — CITALOPRAM HYDROBROMIDE 40 MG/1
40 TABLET ORAL DAILY
Qty: 90 TABLET | Refills: 3 | Status: SHIPPED | OUTPATIENT
Start: 2025-03-24 | End: 2025-06-22

## 2025-03-24 RX ORDER — NORETHINDRONE ACETATE AND ETHINYL ESTRADIOL 1.5-30(21)
1 KIT ORAL DAILY
Qty: 84 TABLET | Refills: 3 | Status: SHIPPED | OUTPATIENT
Start: 2025-03-24

## 2025-03-24 RX ORDER — BACLOFEN 10 MG/1
10 TABLET ORAL 3 TIMES DAILY
Qty: 90 TABLET | Refills: 3 | Status: SHIPPED | OUTPATIENT
Start: 2025-03-24

## 2025-03-24 SDOH — ECONOMIC STABILITY: FOOD INSECURITY: WITHIN THE PAST 12 MONTHS, THE FOOD YOU BOUGHT JUST DIDN'T LAST AND YOU DIDN'T HAVE MONEY TO GET MORE.: NEVER TRUE

## 2025-03-24 SDOH — ECONOMIC STABILITY: FOOD INSECURITY: WITHIN THE PAST 12 MONTHS, YOU WORRIED THAT YOUR FOOD WOULD RUN OUT BEFORE YOU GOT MONEY TO BUY MORE.: NEVER TRUE

## 2025-03-24 ASSESSMENT — PATIENT HEALTH QUESTIONNAIRE - PHQ9
10. IF YOU CHECKED OFF ANY PROBLEMS, HOW DIFFICULT HAVE THESE PROBLEMS MADE IT FOR YOU TO DO YOUR WORK, TAKE CARE OF THINGS AT HOME, OR GET ALONG WITH OTHER PEOPLE: NOT DIFFICULT AT ALL
5. POOR APPETITE OR OVEREATING: NOT AT ALL
SUM OF ALL RESPONSES TO PHQ QUESTIONS 1-9: 4
8. MOVING OR SPEAKING SO SLOWLY THAT OTHER PEOPLE COULD HAVE NOTICED. OR THE OPPOSITE, BEING SO FIGETY OR RESTLESS THAT YOU HAVE BEEN MOVING AROUND A LOT MORE THAN USUAL: NOT AT ALL
3. TROUBLE FALLING OR STAYING ASLEEP: MORE THAN HALF THE DAYS
2. FEELING DOWN, DEPRESSED OR HOPELESS: NOT AT ALL
2. FEELING DOWN, DEPRESSED OR HOPELESS: NOT AT ALL
9. THOUGHTS THAT YOU WOULD BE BETTER OFF DEAD, OR OF HURTING YOURSELF: NOT AT ALL
4. FEELING TIRED OR HAVING LITTLE ENERGY: SEVERAL DAYS
7. TROUBLE CONCENTRATING ON THINGS, SUCH AS READING THE NEWSPAPER OR WATCHING TELEVISION: NOT AT ALL
10. IF YOU CHECKED OFF ANY PROBLEMS, HOW DIFFICULT HAVE THESE PROBLEMS MADE IT FOR YOU TO DO YOUR WORK, TAKE CARE OF THINGS AT HOME, OR GET ALONG WITH OTHER PEOPLE: NOT DIFFICULT AT ALL
7. TROUBLE CONCENTRATING ON THINGS, SUCH AS READING THE NEWSPAPER OR WATCHING TELEVISION: NOT AT ALL
5. POOR APPETITE OR OVEREATING: NOT AT ALL
1. LITTLE INTEREST OR PLEASURE IN DOING THINGS: SEVERAL DAYS
SUM OF ALL RESPONSES TO PHQ QUESTIONS 1-9: 4
4. FEELING TIRED OR HAVING LITTLE ENERGY: SEVERAL DAYS
SUM OF ALL RESPONSES TO PHQ QUESTIONS 1-9: 4
9. THOUGHTS THAT YOU WOULD BE BETTER OFF DEAD, OR OF HURTING YOURSELF: NOT AT ALL
SUM OF ALL RESPONSES TO PHQ QUESTIONS 1-9: 4
6. FEELING BAD ABOUT YOURSELF - OR THAT YOU ARE A FAILURE OR HAVE LET YOURSELF OR YOUR FAMILY DOWN: NOT AT ALL
3. TROUBLE FALLING OR STAYING ASLEEP: MORE THAN HALF THE DAYS
SUM OF ALL RESPONSES TO PHQ QUESTIONS 1-9: 4
8. MOVING OR SPEAKING SO SLOWLY THAT OTHER PEOPLE COULD HAVE NOTICED. OR THE OPPOSITE - BEING SO FIDGETY OR RESTLESS THAT YOU HAVE BEEN MOVING AROUND A LOT MORE THAN USUAL: NOT AT ALL
6. FEELING BAD ABOUT YOURSELF - OR THAT YOU ARE A FAILURE OR HAVE LET YOURSELF OR YOUR FAMILY DOWN: NOT AT ALL
1. LITTLE INTEREST OR PLEASURE IN DOING THINGS: SEVERAL DAYS

## 2025-03-24 ASSESSMENT — ENCOUNTER SYMPTOMS
VOMITING: 0
WHEEZING: 0
PHOTOPHOBIA: 0
DIARRHEA: 0
CONSTIPATION: 0
ABDOMINAL PAIN: 0
BACK PAIN: 0
COUGH: 0
NAUSEA: 0
EYE DISCHARGE: 0
SHORTNESS OF BREATH: 0
ABDOMINAL DISTENTION: 0

## 2025-03-24 NOTE — ASSESSMENT & PLAN NOTE
Chronic, at goal (stable), continue current treatment plan    Orders:    topiramate (TOPAMAX) 50 MG tablet; Take 1 tablet by mouth 2 times daily    baclofen (LIORESAL) 10 MG tablet; Take 1 tablet by mouth 3 times daily    rizatriptan (MAXALT) 10 MG tablet; Take 1 tablet by mouth once as needed for Migraine May repeat in 2 hours if needed

## 2025-03-24 NOTE — ASSESSMENT & PLAN NOTE
Stable during the day on Celexa, but having nighttime issues.  Add Magnesium at night.     Orders:    citalopram (CELEXA) 40 MG tablet; Take 1 tablet by mouth daily

## 2025-03-24 NOTE — PROGRESS NOTES
3/24/25  Suzette Yi : 2000 Sex: female  Age: 24 y.o.    Chief Complaint   Patient presents with    Annual Exam     HPI:  24 y.o. female presents today for yearly physical.  Patient's chart, medical, surgical and medication history all reviewed.    Well Adult Physical  Patient here for a physical exam.  The patient reports problems - migraines improved.  Having control of anxiety during the day, but waking up frequently at night and having some issues with possible sleep paralysis.  Still with mass in L buttock from fall 18 months ago.    Do you take any herbs or supplements that were not prescribed by a doctor? no   Are you taking calcium supplements? no   Are you taking aspirin daily? no    Colonoscopy: No colonoscopy on file   No cologuard on file  No FIT/FOBT on file   No flexible sigmoidoscopy on file  Dental visit: Within last 6 mos  Vision check:  No Problems  PAP:  Not yet.  Never sexually active.     Last time routine bloodwork was done:  2022    Immunization status: missing doses of HPV.    Smoking status: never    Physical activity:  infrequently    ROS:  Review of Systems   Constitutional:  Negative for chills, fatigue and fever.   Eyes:  Negative for photophobia, discharge and visual disturbance.   Respiratory:  Negative for cough, shortness of breath and wheezing.    Cardiovascular:  Negative for chest pain and palpitations.   Gastrointestinal:  Negative for abdominal distention, abdominal pain, constipation, diarrhea, nausea and vomiting.   Musculoskeletal:  Negative for arthralgias and back pain.   Skin:  Negative for rash.   Neurological:  Positive for headaches. Negative for dizziness.   Psychiatric/Behavioral:  Positive for sleep disturbance. Negative for dysphoric mood. The patient is nervous/anxious.    All other systems reviewed and are negative.     Current Outpatient Medications on File Prior to Visit   Medication Sig Dispense Refill    albuterol sulfate HFA

## 2025-03-31 ENCOUNTER — RESULTS FOLLOW-UP (OUTPATIENT)
Dept: PRIMARY CARE CLINIC | Age: 25
End: 2025-03-31

## 2025-04-23 DIAGNOSIS — Z00.01 ENCOUNTER FOR WELL ADULT EXAM WITH ABNORMAL FINDINGS: ICD-10-CM

## 2025-04-23 DIAGNOSIS — R79.89 ELEVATED CORTISOL LEVEL: ICD-10-CM

## 2025-04-23 DIAGNOSIS — E55.9 VITAMIN D INSUFFICIENCY: ICD-10-CM

## 2025-04-23 DIAGNOSIS — Z13.6 SCREENING FOR CARDIOVASCULAR CONDITION: ICD-10-CM

## 2025-04-23 DIAGNOSIS — N93.9 ABNORMAL UTERINE BLEEDING (AUB): ICD-10-CM

## 2025-04-23 LAB
ALBUMIN: 4.4 G/DL (ref 3.5–5.2)
ALP BLD-CCNC: 68 U/L (ref 35–104)
ALT SERPL-CCNC: 28 U/L (ref 0–35)
ANION GAP SERPL CALCULATED.3IONS-SCNC: 11 MMOL/L (ref 7–16)
AST SERPL-CCNC: 25 U/L (ref 0–35)
BASOPHILS ABSOLUTE: 0.04 K/UL (ref 0–0.2)
BASOPHILS RELATIVE PERCENT: 1 % (ref 0–2)
BILIRUB SERPL-MCNC: 0.4 MG/DL (ref 0–1.2)
BUN BLDV-MCNC: 13 MG/DL (ref 6–20)
CALCIUM SERPL-MCNC: 9.6 MG/DL (ref 8.6–10)
CHLORIDE BLD-SCNC: 105 MMOL/L (ref 98–107)
CHOLESTEROL, TOTAL: 140 MG/DL
CO2: 23 MMOL/L (ref 22–29)
CORTISOL COLLECTION INFO: 0
CORTISOL: 8.3 UG/DL (ref 2.7–18.4)
CREAT SERPL-MCNC: 0.8 MG/DL (ref 0.5–1)
EOSINOPHILS ABSOLUTE: 0.08 K/UL (ref 0.05–0.5)
EOSINOPHILS RELATIVE PERCENT: 1 % (ref 0–6)
ESTRADIOL LEVEL: 69.9 PG/ML
FOLLICLE STIMULATING HORMONE: 6.3 MIU/ML
GFR, ESTIMATED: >90 ML/MIN/1.73M2
GLUCOSE BLD-MCNC: 84 MG/DL (ref 74–99)
HCT VFR BLD CALC: 40.8 % (ref 34–48)
HDLC SERPL-MCNC: 45 MG/DL
HEMOGLOBIN: 13.3 G/DL (ref 11.5–15.5)
IMMATURE GRANULOCYTES %: 0 % (ref 0–5)
IMMATURE GRANULOCYTES ABSOLUTE: <0.03 K/UL (ref 0–0.58)
LDL CHOLESTEROL: 84 MG/DL
LH: 17.8 MIU/ML
LYMPHOCYTES ABSOLUTE: 2.14 K/UL (ref 1.5–4)
LYMPHOCYTES RELATIVE PERCENT: 36 % (ref 20–42)
MCH RBC QN AUTO: 28.4 PG (ref 26–35)
MCHC RBC AUTO-ENTMCNC: 32.6 G/DL (ref 32–34.5)
MCV RBC AUTO: 87.2 FL (ref 80–99.9)
MONOCYTES ABSOLUTE: 0.49 K/UL (ref 0.1–0.95)
MONOCYTES RELATIVE PERCENT: 8 % (ref 2–12)
NEUTROPHILS ABSOLUTE: 3.22 K/UL (ref 1.8–7.3)
NEUTROPHILS RELATIVE PERCENT: 54 % (ref 43–80)
PDW BLD-RTO: 12.5 % (ref 11.5–15)
PLATELET # BLD: 308 K/UL (ref 130–450)
PMV BLD AUTO: 9.5 FL (ref 7–12)
POTASSIUM SERPL-SCNC: 4.1 MMOL/L (ref 3.5–5.1)
PROGESTERONE LEVEL: 0.16 NG/ML
RBC # BLD: 4.68 M/UL (ref 3.5–5.5)
SODIUM BLD-SCNC: 139 MMOL/L (ref 136–145)
TOTAL PROTEIN: 7.6 G/DL (ref 6.4–8.3)
TRIGL SERPL-MCNC: 54 MG/DL
TSH SERPL DL<=0.05 MIU/L-ACNC: 1.36 UIU/ML (ref 0.27–4.2)
VITAMIN D 25-HYDROXY: 10.6 NG/ML (ref 30–100)
VLDLC SERPL CALC-MCNC: 11 MG/DL
WBC # BLD: 6 K/UL (ref 4.5–11.5)

## 2025-04-25 LAB
DHEAS (DHEA SULFATE): 280 UG/DL (ref 148–407)
SEX HORMONE BINDING GLOBULIN: 33 NMOL/L (ref 25–122)
TESTOSTERONE FREE-NONMALE: 10.5 PG/ML (ref 0.8–7.4)
TESTOSTERONE TOTAL: 58 NG/DL (ref 8–48)
TESTOSTERONE, BIOAVAILABLE: 24.6 NG/DL (ref 2.2–20.6)

## 2025-04-28 LAB — CORTISOL, FREE, LC/MS/MS: 0.6 UG/DL
